# Patient Record
Sex: FEMALE | Race: WHITE | Employment: OTHER | URBAN - METROPOLITAN AREA
[De-identification: names, ages, dates, MRNs, and addresses within clinical notes are randomized per-mention and may not be internally consistent; named-entity substitution may affect disease eponyms.]

---

## 2018-08-07 ENCOUNTER — HOSPITAL ENCOUNTER (OUTPATIENT)
Dept: GENERAL RADIOLOGY | Age: 83
Discharge: HOME | End: 2018-08-07
Attending: FAMILY MEDICINE
Payer: MEDICARE

## 2018-08-07 DIAGNOSIS — R06.09 DYSPNEA ON EXERTION: ICD-10-CM

## 2018-08-07 PROCEDURE — 71046 X-RAY EXAM CHEST 2 VIEWS: CPT

## 2018-08-17 ENCOUNTER — HOSPITAL ENCOUNTER (OUTPATIENT)
Dept: NON INVASIVE DIAGNOSTICS | Age: 83
Discharge: HOME | End: 2018-08-17
Attending: FAMILY MEDICINE
Payer: MEDICARE

## 2018-08-17 DIAGNOSIS — R06.09 DOE (DYSPNEA ON EXERTION): ICD-10-CM

## 2018-08-17 PROCEDURE — 93306 TTE W/DOPPLER COMPLETE: CPT

## 2018-09-27 PROBLEM — L03.019 PARONYCHIA OF FINGER: Status: ACTIVE | Noted: 2018-09-27

## 2018-09-27 PROBLEM — E78.00 HYPERCHOLESTEREMIA: Status: ACTIVE | Noted: 2018-09-27

## 2018-09-27 PROBLEM — K21.9 GERD (GASTROESOPHAGEAL REFLUX DISEASE): Status: ACTIVE | Noted: 2018-09-27

## 2018-09-27 PROBLEM — M17.9 OSTEOARTHRITIS OF KNEE: Status: ACTIVE | Noted: 2018-09-27

## 2018-09-27 PROBLEM — R06.00 DYSPNEA: Status: ACTIVE | Noted: 2018-09-27

## 2018-09-27 RX ORDER — ALBUTEROL SULFATE 90 UG/1
AEROSOL, METERED RESPIRATORY (INHALATION)
COMMUNITY
End: 2019-05-13 | Stop reason: SDUPTHER

## 2018-09-27 RX ORDER — BISMUTH SUBSALICYLATE 262 MG
1 TABLET,CHEWABLE ORAL DAILY
COMMUNITY
End: 2018-10-01

## 2018-09-27 RX ORDER — PROPRANOLOL HYDROCHLORIDE 20 MG/1
TABLET ORAL 2 TIMES DAILY
COMMUNITY
End: 2018-10-09 | Stop reason: SDUPTHER

## 2018-09-27 RX ORDER — B-COMPLEX WITH VITAMIN C
1 TABLET ORAL DAILY
COMMUNITY
End: 2018-10-01

## 2018-10-01 ENCOUNTER — OFFICE VISIT (OUTPATIENT)
Dept: FAMILY MEDICINE CLINIC | Age: 83
End: 2018-10-01

## 2018-10-01 VITALS
SYSTOLIC BLOOD PRESSURE: 126 MMHG | OXYGEN SATURATION: 96 % | TEMPERATURE: 97.8 F | WEIGHT: 172 LBS | DIASTOLIC BLOOD PRESSURE: 81 MMHG | HEIGHT: 65 IN | RESPIRATION RATE: 18 BRPM | HEART RATE: 70 BPM | BODY MASS INDEX: 28.66 KG/M2

## 2018-10-01 DIAGNOSIS — J43.8 OTHER EMPHYSEMA (HCC): Primary | ICD-10-CM

## 2018-10-01 RX ORDER — IPRATROPIUM BROMIDE 21 UG/1
SPRAY, METERED NASAL
Refills: 0 | COMMUNITY
Start: 2018-08-07 | End: 2019-09-06

## 2018-10-01 RX ORDER — NEOMYCIN SULFATE, POLYMYXIN B SULFATE AND HYDROCORTISONE 10; 3.5; 1 MG/ML; MG/ML; [USP'U]/ML
SUSPENSION/ DROPS AURICULAR (OTIC)
Refills: 0 | COMMUNITY
Start: 2018-08-07 | End: 2019-09-06

## 2018-10-01 NOTE — PROGRESS NOTES
Jose Aguiar is a 80 y.o. female presenting for Follow-up Genie [facility] Wheezing/COPD Noise of breathing bothers her. Sometimes dyspnic Long smoking history 30 years No chest pain CXR showed emphysema ECHO was ok Shops, does all ADL's and housework, upstairs bedroom. All no problem. Needs rest for extended walking. Nasal spray helps Takes inderal for tremors Review of Systems Constitutional: Negative for chills, fever and malaise/fatigue. Respiratory: Positive for cough, shortness of breath and wheezing. Negative for hemoptysis and sputum production. Cardiovascular: Negative for chest pain. Past Medical History:  
Diagnosis Date  Benign essential tremor  GERD (gastroesophageal reflux disease)  Hyperlipidemia  Osteoarthritis Past Surgical History:  
Procedure Laterality Date  HX DILATION AND CURETTAGE    
 x2  
 HX TONSILLECTOMY Family History Problem Relation Age of Onset  Cancer Brother 70  
  brain cancer  Cancer Brother 79  
  brain cancer  Cancer Brother 54  
  colon cancer Social History Social History  Marital status:  Spouse name: N/A  
 Number of children: N/A  
 Years of education: N/A Occupational History  Not on file. Social History Main Topics  Smoking status: Former Smoker Packs/day: 2.00 Years: 30.00 Types: Cigarettes Quit date: 4/13/1983  Smokeless tobacco: Never Used  Alcohol use Yes  Drug use: No  
 Sexual activity: Not on file Other Topics Concern  Not on file Social History Narrative Current Outpatient Prescriptions Medication Sig Dispense Refill  ipratropium (ATROVENT) 0.03 % nasal spray INSTILL 2 SPRAYS IN EACH NOSTRIL DAILY  0  
 neomycin-polymyxin-hydrocortisone, buffered, (PEDIOTIC) 3.5-10,000-1 mg/mL-unit/mL-% otic suspension INSTILL 4 DROPS INTO AFFECTED EAR(S) 4 TIMES DAILY  0  
  propranolol (INDERAL) 20 mg tablet Take  by mouth two (2) times a day.  albuterol (PROVENTIL HFA, VENTOLIN HFA, PROAIR HFA) 90 mcg/actuation inhaler Take  by inhalation.  simvastatin (ZOCOR) 40 mg tablet Take  by mouth nightly.  esomeprazole (NEXIUM) 40 mg capsule Take  by mouth daily.  aspirin 81 mg tablet Take 81 mg by mouth. Allergies Allergen Reactions  Sulfa (Sulfonamide Antibiotics) Itching Vitals:  
 10/01/18 1446 BP: 126/81 Pulse: 70 Resp: 18 Temp: 97.8 °F (36.6 °C) TempSrc: Oral  
SpO2: 96% Weight: 172 lb (78 kg) Height: 5' 4.5\" (1.638 m) Body mass index is 29.07 kg/(m^2). Objective General: Patient alert and oriented and in NAD HEENT: PER/EOMI, no conjunctival pallor or scleral icterus. No thyromegaly or cervical lymphadenopathy Heart: Regular rate and rhythm, No murmurs, rubs or gallops. Lungs: Clear to auscultation bilaterally, no wheezing, rales or rhonchi Abd: +BS, non-tender, non-distended Ext: 1+ edema bilateral ankles Skin: No rashes or lesions noted on exposed skin Neuro: AAOx3 Psych: Appropriate mood and affect Assessment and Plan: 
 
1. COPD/Emphysema ([address] Currently mild and with minimal sx Discussed getting flu shot, 
Use albuterol PRN. Discussed:   
Possibly using spiriva, 
Pulmonary rehab and exercise Avoid ill people in winter Could consider PFT's Patient is content to continue what we are currently doing IE albuterol prn and will get flu shot Diagnosis and plan discussed with patient who verbillized understanding. Follow-up Disposition: 
Return if symptoms worsen or fail to improve, for Asthma/COPD follow up.  
 
[facility]

## 2018-10-01 NOTE — PATIENT INSTRUCTIONS
Get flu shot, use albuterol prn. See me if you want additional testing or alternatives to current treatment.

## 2018-10-09 RX ORDER — PROPRANOLOL HYDROCHLORIDE 20 MG/1
TABLET ORAL
Qty: 180 TAB | Refills: 1 | Status: SHIPPED | OUTPATIENT
Start: 2018-10-09 | End: 2019-07-21 | Stop reason: SDUPTHER

## 2019-02-04 RX ORDER — SIMVASTATIN 40 MG/1
TABLET, FILM COATED ORAL
Qty: 90 TAB | Refills: 1 | Status: SHIPPED | OUTPATIENT
Start: 2019-02-04 | End: 2019-07-21 | Stop reason: SDUPTHER

## 2019-02-04 RX ORDER — ESOMEPRAZOLE MAGNESIUM 40 MG/1
CAPSULE, DELAYED RELEASE ORAL
Qty: 90 CAP | Refills: 1 | Status: SHIPPED | OUTPATIENT
Start: 2019-02-04 | End: 2019-07-21 | Stop reason: SDUPTHER

## 2019-07-23 RX ORDER — PROPRANOLOL HYDROCHLORIDE 20 MG/1
TABLET ORAL
Qty: 180 TAB | Refills: 1 | Status: SHIPPED | OUTPATIENT
Start: 2019-07-23 | End: 2019-09-06

## 2019-07-23 RX ORDER — SIMVASTATIN 40 MG/1
TABLET, FILM COATED ORAL
Qty: 90 TAB | Refills: 1 | Status: SHIPPED | OUTPATIENT
Start: 2019-07-23 | End: 2020-01-17

## 2019-07-23 RX ORDER — ESOMEPRAZOLE MAGNESIUM 40 MG/1
CAPSULE, DELAYED RELEASE ORAL
Qty: 90 CAP | Refills: 1 | Status: SHIPPED | OUTPATIENT
Start: 2019-07-23 | End: 2020-01-17

## 2019-08-15 ENCOUNTER — OFFICE VISIT (OUTPATIENT)
Dept: FAMILY MEDICINE CLINIC | Age: 84
End: 2019-08-15

## 2019-08-15 VITALS
WEIGHT: 165 LBS | HEART RATE: 63 BPM | BODY MASS INDEX: 27.49 KG/M2 | DIASTOLIC BLOOD PRESSURE: 76 MMHG | OXYGEN SATURATION: 95 % | SYSTOLIC BLOOD PRESSURE: 132 MMHG | TEMPERATURE: 97.6 F | HEIGHT: 65 IN | RESPIRATION RATE: 16 BRPM

## 2019-08-15 DIAGNOSIS — Z13.39 SCREENING FOR ALCOHOLISM: ICD-10-CM

## 2019-08-15 DIAGNOSIS — Z00.00 MEDICARE ANNUAL WELLNESS VISIT, SUBSEQUENT: ICD-10-CM

## 2019-08-15 DIAGNOSIS — E78.00 HYPERCHOLESTEREMIA: ICD-10-CM

## 2019-08-15 DIAGNOSIS — Z13.31 SCREENING FOR DEPRESSION: ICD-10-CM

## 2019-08-15 DIAGNOSIS — E74.39 GLUCOSE INTOLERANCE: ICD-10-CM

## 2019-08-15 DIAGNOSIS — G20 PARKINSON DISEASE (HCC): Primary | ICD-10-CM

## 2019-08-15 DIAGNOSIS — T88.7XXA MEDICATION SIDE EFFECT: ICD-10-CM

## 2019-08-15 NOTE — PROGRESS NOTES
Omar Ballesteros is a 80 y.o. female      Chief Complaint   Patient presents with   [place] Annual Wellness Visit         1. Have you been to the ER, urgent care clinic since your last visit? Hospitalized since your last visit? no      2. Have you seen or consulted any other health care providers outside of the [address] [place] [address] since your last visit? Include any pap smears or colon screening.   no

## 2019-08-15 NOTE — PATIENT INSTRUCTIONS
Medicare Wellness Visit, Female The best way to live healthy is to have a lifestyle where you eat a well-balanced diet, exercise regularly, limit alcohol use, and quit all forms of tobacco/nicotine, if applicable. Regular preventive services are another way to keep healthy. Preventive services (vaccines, screening tests, monitoring & exams) can help personalize your care plan, which helps you manage your own care. Screening tests can find health problems at the earliest stages, when they are easiest to treat. Marco Villagran follows the current, evidence-based guidelines published by the [facility] ([facility]) when recommending preventive services for our patients. Because we follow these guidelines, sometimes recommendations change over time as research supports it. (For example, mammograms used to be recommended annually. Even though Medicare will still pay for an annual mammogram, the newer guidelines recommend a mammogram every two years for women of average risk.) Of course, you and your doctor may decide to screen more often for some diseases, based on your risk and your health status. Preventive services for you include: - Medicare offers their members a free annual wellness visit, which is time for you and your primary care provider to discuss and plan for your preventive service needs. Take advantage of this benefit every year! 
-All adults over the age of 72 should receive the recommended pneumonia vaccines. Current USPSTF guidelines recommend a series of two vaccines for the best pneumonia protection.  
-All adults should have a flu vaccine yearly and a tetanus vaccine every 10 years. All adults age 61 and older should receive a shingles vaccine once in their lifetime.   
-A bone mass density test is recommended when a woman turns 65 to screen for osteoporosis. This test is only recommended one time, as a screening. Some providers will use this same test as a disease monitoring tool if you already have osteoporosis. -All adults age 38-68 who are overweight should have a diabetes screening test once every three years.  
-Other screening tests and preventive services for persons with diabetes include: an eye exam to screen for diabetic retinopathy, a kidney function test, a foot exam, and stricter control over your cholesterol.  
-Cardiovascular screening for adults with routine risk involves an electrocardiogram (ECG) at intervals determined by your doctor.  
-Colorectal cancer screenings should be done for adults age 54-65 with no increased risk factors for colorectal cancer. There are a number of acceptable methods of screening for this type of cancer. Each test has its own benefits and drawbacks. Discuss with your doctor what is most appropriate for you during your annual wellness visit. The different tests include: colonoscopy (considered the best screening method), a fecal occult blood test, a fecal DNA test, and sigmoidoscopy. -Breast cancer screenings are recommended every other year for women of normal risk, age 54-69. 
-Cervical cancer screenings for women over age 72 are only recommended with certain risk factors.  
-All adults born between [facility] should be screened once for Hepatitis C. Here is a list of your current Health Maintenance items (your personalized list of preventive services) with a due date: 
Health Maintenance Due Topic Date Due  Shingles Vaccine (1 of 2) 08/22/1981  Glaucoma Screening   08/22/1996  Bone Mineral Density   08/22/1996  
 DTaP/Tdap/Td  (1 - Tdap) 05/11/2006 [place] Annual Well Visit  03/14/2018  Flu Vaccine  08/01/2019 Parkinson's Disease: Care Instructions Your Care Instructions Parkinson's disease can cause tremors, stiffness, and problems with movement. Severe or advanced cases can also cause problems with thinking. In Parkinson's disease, part of the brain cannot make enough dopamine, a chemical that helps control movement. Taking your medicines correctly and getting regular exercise may help you maintain your quality of life. There are many things that can cause Parkinson's disease symptoms, including some medicine, some toxins, and trauma to the head. The cause in most cases is not known. Follow-up care is a key part of your treatment and safety. Be sure to make and go to all appointments, and call your doctor if you are having problems. It's also a good idea to know your test results and keep a list of the medicines you take. How can you care for yourself at home? General care · Take your medicines exactly as prescribed. Call your doctor if you think you are having a problem with your medicine. · Make sure your home is safe: 
? Place furniture so that you have something to hold on to as you walk around the house. ? Use chairs that make it easier to sit down and stand up. ? Group the things you use most, such as reading glasses, keys, and the telephone, in one easy-to-reach place. ? Tack down rugs so that you do not trip. ? Put no-slip tape and handrails in the tub to prevent falls. · Use a cane, walker, or scooter if your doctor suggests it. · Keep up your normal activities as much as you can. · Find ways to manage stress, which can make symptoms worse. · Spend time with family and friends. Join a support group for people with Parkinson's disease if you want extra help. · Depression is common with this condition. Tell your doctor if you have trouble sleeping, are eating too much or are not hungry, or feel sad or tearful all the time. Depression can be treated with medicine and counseling. Diet and exercise · Eat a balanced diet. · If you are taking levodopa, do not eat protein at the same time you take your medicine.  Levodopa may not work as well if you take it at the same time you eat protein. You can eat normal amounts of protein. Talk to your doctor if you have questions. · If you have problems swallowing, change how and what you eat: ? Try thick drinks, such as milk shakes. They are easier to swallow than other fluids. ? Do not eat foods that crumble easily. These can cause choking. ? Use a  to prepare food. Soft foods need less chewing. ? Eat small meals often so that you do not get tired from eating heavy meals. · Drink plenty of water and eat a high-fiber diet to prevent constipation. Parkinson'sand the medicines that treat itmay slow your intestines. · Get exercise on most days. Work with your doctor to set up a program of walking, swimming, or other exercise you are able to do. When should you call for help? Call your doctor now or seek immediate medical care if: 
  · You have a change in your symptoms.  
  · You develop other problems from your condition, such as: 
? Injury from a fall. ? Thinking or memory problems. ? A urinary tract infection (burning pain when urinating).  
 Watch closely for changes in your health, and be sure to contact your doctor if: 
  · You lose weight because of problems with eating.  
  · You want more information about your condition or your medicines. Where can you learn more? Go to http://erick-cas.info/. Enter J512 in the search box to learn more about \"Parkinson's Disease: Care Instructions. \" Current as of: March 28, 2019 Content Version: 12.1 © 6089-0548 [employer]. Care instructions adapted under license by [employer] (which disclaims liability or warranty for this information). If you have questions about a medical condition or this instruction, always ask your healthcare professional. [place] [address] any warranty or liability for your use of this information.

## 2019-08-15 NOTE — PROGRESS NOTES
This is the Subsequent Medicare Annual Wellness Exam, performed 12 months or more after the Initial AWV or the last Subsequent AWV    I have reviewed the patient's medical history in detail and updated the computerized patient record. History     Past Medical History:   Diagnosis Date    Benign essential tremor     GERD (gastroesophageal reflux disease)     Hyperlipidemia     Osteoarthritis       Past Surgical History:   Procedure Laterality Date    HX DILATION AND CURETTAGE      x2    HX TONSILLECTOMY       Current Outpatient Medications   Medication Sig Dispense Refill    diph,pertuss,acel,,tetanus vac,PF, (ADACEL) 2 Lf-(2.5-5-3-5 mcg)-5Lf/0.5 mL syrg vaccine 0.5 mL by IntraMUSCular route once for 1 dose. 0.5 mL 0    varicella-zoster recombinant, PF, (SHINGRIX, PF,) 50 mcg/0.5 mL susr injection 0.5mL by IntraMUSCular route once now and then repeat in 2-6 months 0.5 mL 1    simvastatin (ZOCOR) 40 mg tablet TAKE 1 TABLET AT BEDTIME 90 Tab 1    esomeprazole (NEXIUM) 40 mg capsule TAKE 1 CAPSULE DAILY 90 Cap 1    propranolol (INDERAL) 20 mg tablet TAKE 1 TABLET TWICE A  Tab 1    VENTOLIN HFA 90 mcg/actuation inhaler INHALE 1-2 PUFFS BY MOUTH EVERY 4-6 HOURS AS NEEDED FOR SHORTNESS OF BREATH,COUGH,WHEEZING 18 Inhaler 2    ipratropium (ATROVENT) 0.03 % nasal spray INSTILL 2 SPRAYS IN EACH NOSTRIL DAILY  0    neomycin-polymyxin-hydrocortisone, buffered, (PEDIOTIC) 3.5-10,000-1 mg/mL-unit/mL-% otic suspension INSTILL 4 DROPS INTO AFFECTED EAR(S) 4 TIMES DAILY  0    aspirin 81 mg tablet Take 81 mg by mouth.          Allergies   Allergen Reactions    Sulfa (Sulfonamide Antibiotics) Itching     Family History   Problem Relation Age of Onset    Cancer Brother 70        brain cancer    Cancer Brother 79        brain cancer    Cancer Brother 54        colon cancer     Social History     Tobacco Use    Smoking status: Former Smoker     Packs/day: 2.00     Years: 30.00     Pack years: 60.00 Types: Cigarettes     Last attempt to quit: 1983     Years since quittin.3    Smokeless tobacco: Never Used   Substance Use Topics    Alcohol use: Yes     Patient Active Problem List   Diagnosis Code    GERD (gastroesophageal reflux disease) K21.9    Hypercholesteremia E78.00    Osteoarthritis of knee M17.10    Paronychia of finger L03.019    Dyspnea R06.00    Other emphysema ([address] J43.8       Depression Risk Factor Screening:     3 most recent PHQ Screens 8/15/2019   Little interest or pleasure in doing things Not at all   Feeling down, depressed, irritable, or hopeless Not at all   Total Score PHQ 2 0     Alcohol Risk Factor Screening: You do not drink alcohol or very rarely. Functional Ability and Level of Safety:   Hearing Loss  Hearing is good. The patient needs further evaluation. Activities of Daily Living  The home contains: handrails  Patient does total self care    Fall Risk  Fall Risk Assessment, last 12 mths 8/15/2019   Able to walk? Yes   Fall in past 12 months? No       Abuse Screen  Patient is not abused    Cognitive Screening   Evaluation of Cognitive Function:  Has your family/caregiver stated any concerns about your memory: no  Normal    Patient Care Team   Patient Care Team:  Radha Alberts MD as PCP - General (Family Practice)    Assessment/Plan   Education and counseling provided:  Are appropriate based on today's review and evaluation  End-of-Life planning (with patient's consent)  Pneumococcal Vaccine  Influenza Vaccine  Screening Mammography  Screening Pap and pelvic (covered once every 2 years)  Colorectal cancer screening tests  Screening for glaucoma    Tremor a lot worse particularly left arm but occasionally affected all extremitiies  No falls or balance issues  Beta blocker has not helped.     Hyperchol on statin doing well    Blood sugar up in past, has lost weight    ROS:  General/Constitutional:  No headache, fever, fatigue, weight loss or weight gain Eyes:  No redness, pruritis, pain, visual changes, swelling, or discharge    Ears:  No pain, loss or changes in hearin    Neck:  No swelling, masses, stiffness, pain, or limited movemen    Cardiac:   No chest pain, racing heartbeat or palpitations  Respiratory:  No cough or shortness of breath    GI:  No nausea/vomiting, diarrhea, abdominal pain, bloody or dark stools     :  No dysuria or  hematuria   Musculoskeletal:  No joint pain, muscle pain, back pain, neck pain. No joint swelling or redness. Neurological:  No loss of consciousness, dizziness, seizures, dysarthria, cognitive changes, memory changes,  problems with balance, or unilateral weakness    Skin: No rash     Physical Examination: General appearance - alert, well appearing, and in no distress, oriented to person, place, and time and normal appearing weight  Mental status -  normal mood, behavior, speech, dress, motor activity, and thought processes, no expressed suicidal or homicidal ideation, no hallucinations  Ears - bilateral TM's and external ear canals normal  Nose - normal and patent, no erythema, discharge or polyps  Mouth - mucous membranes moist, pharynx normal without lesions  Neck - supple, no significant adenopathy  Breast-sees GYN  Chest - normal chest excursion, normal inspiratory and expiratory function. Clear to ausculation bilaterally. Heart - normal rate, regular rhythm, normal S1, S2, no murmurs, rubs, clicks or gallops, no extremity edema  Abdomen- benign, no organomegaly or masses  GYN-sees GYN  Skin-no rashes or suspicious moles  Neuro normal speech, moves all extremities, normal gait, Marked TREMOR at rest, most prominent left hand, pill rolling  Musculoskeletal- grossly normal joint and motor function. Diagnoses and all orders for this visit:    1. Parkinson disease ([facility] [address] new onset  Tremor much worse that previous and now at rest with pill rolling  Referral Dr. Sebastien Lennon    2. Hypercholesteremia  Continue statin  -     LIPID PANEL  -     HEPATIC FUNCTION PANEL    3. Glucose intolerance  -     METABOLIC PANEL, BASIC  -     HEMOGLOBIN A1C WITH EAG  -     CBC WITH AUTOMATED DIFF    4. Medication side effect  -     METABOLIC PANEL, BASIC  -     HEMOGLOBIN A1C WITH EAG  -     CBC WITH AUTOMATED DIFF    5. Medicare annual wellness visit, subsequent updated  -     diph,pertuss,acel,,tetanus vac,PF, (ADACEL) 2 Lf-(2.5-5-3-5 mcg)-5Lf/0.5 mL syrg vaccine; 0.5 mL by IntraMUSCular route once for 1 dose.  -     varicella-zoster recombinant, PF, (SHINGRIX, PF,) 50 mcg/0.5 mL susr injection; 0.5mL by IntraMUSCular route once now and then repeat in 2-6 months    6.  Screening for alcoholism no issues  -     NE ANNUAL ALCOHOL SCREEN 15 MIN    7. Screening for depression no issues  -     [place] Maintenance Due   Topic Date Due    Shingrix Vaccine Age 50> (1 of 2) 08/22/1981    GLAUCOMA SCREENING Q2Y  08/22/1996    Bone Densitometry (Dexa) Screening  08/22/1996    DTaP/Tdap/Td series (1 - Tdap) 05/11/2006    MEDICARE YEARLY EXAM  03/14/2018    Influenza Age 9 to Adult  08/01/2019

## 2019-08-16 LAB
ALBUMIN SERPL-MCNC: 4.2 G/DL (ref 3.5–4.7)
ALP SERPL-CCNC: 55 IU/L (ref 39–117)
ALT SERPL-CCNC: 12 IU/L (ref 0–32)
AST SERPL-CCNC: 18 IU/L (ref 0–40)
BASOPHILS # BLD AUTO: 0 X10E3/UL (ref 0–0.2)
BASOPHILS NFR BLD AUTO: 1 %
BILIRUB DIRECT SERPL-MCNC: 0.2 MG/DL (ref 0–0.4)
BILIRUB SERPL-MCNC: 0.6 MG/DL (ref 0–1.2)
BUN SERPL-MCNC: 13 MG/DL (ref 8–27)
BUN/CREAT SERPL: 14 (ref 12–28)
CALCIUM SERPL-MCNC: 9.3 MG/DL (ref 8.7–10.3)
CHLORIDE SERPL-SCNC: 104 MMOL/L (ref 96–106)
CHOLEST SERPL-MCNC: 135 MG/DL (ref 100–199)
CO2 SERPL-SCNC: 26 MMOL/L (ref 20–29)
CREAT SERPL-MCNC: 0.92 MG/DL (ref 0.57–1)
EOSINOPHIL # BLD AUTO: 0.1 X10E3/UL (ref 0–0.4)
EOSINOPHIL NFR BLD AUTO: 2 %
ERYTHROCYTE [DISTWIDTH] IN BLOOD BY AUTOMATED COUNT: 14 % (ref 12.3–15.4)
EST. AVERAGE GLUCOSE BLD GHB EST-MCNC: 120 MG/DL
GLUCOSE SERPL-MCNC: 88 MG/DL (ref 65–99)
HBA1C MFR BLD: 5.8 % (ref 4.8–5.6)
HCT VFR BLD AUTO: 42.6 % (ref 34–46.6)
HDLC SERPL-MCNC: 68 MG/DL
HGB BLD-MCNC: 13.6 G/DL (ref 11.1–15.9)
IMM GRANULOCYTES # BLD AUTO: 0 X10E3/UL (ref 0–0.1)
IMM GRANULOCYTES NFR BLD AUTO: 0 %
LDLC SERPL CALC-MCNC: 48 MG/DL (ref 0–99)
LYMPHOCYTES # BLD AUTO: 1.3 X10E3/UL (ref 0.7–3.1)
LYMPHOCYTES NFR BLD AUTO: 32 %
MCH RBC QN AUTO: 29.4 PG (ref 26.6–33)
MCHC RBC AUTO-ENTMCNC: 31.9 G/DL (ref 31.5–35.7)
MCV RBC AUTO: 92 FL (ref 79–97)
MONOCYTES # BLD AUTO: 0.3 X10E3/UL (ref 0.1–0.9)
MONOCYTES NFR BLD AUTO: 8 %
NEUTROPHILS # BLD AUTO: 2.3 X10E3/UL (ref 1.4–7)
NEUTROPHILS NFR BLD AUTO: 57 %
PLATELET # BLD AUTO: 182 X10E3/UL (ref 150–450)
POTASSIUM SERPL-SCNC: 5 MMOL/L (ref 3.5–5.2)
PROT SERPL-MCNC: 6.7 G/DL (ref 6–8.5)
RBC # BLD AUTO: 4.63 X10E6/UL (ref 3.77–5.28)
SODIUM SERPL-SCNC: 143 MMOL/L (ref 134–144)
TRIGL SERPL-MCNC: 94 MG/DL (ref 0–149)
VLDLC SERPL CALC-MCNC: 19 MG/DL (ref 5–40)
WBC # BLD AUTO: 4 X10E3/UL (ref 3.4–10.8)

## 2019-09-06 ENCOUNTER — OFFICE VISIT (OUTPATIENT)
Dept: NEUROLOGY | Age: 84
End: 2019-09-06

## 2019-09-06 VITALS
HEART RATE: 84 BPM | OXYGEN SATURATION: 96 % | RESPIRATION RATE: 18 BRPM | WEIGHT: 165 LBS | DIASTOLIC BLOOD PRESSURE: 66 MMHG | HEIGHT: 65 IN | SYSTOLIC BLOOD PRESSURE: 156 MMHG | BODY MASS INDEX: 27.49 KG/M2

## 2019-09-06 DIAGNOSIS — G20 PARKINSONISM, UNSPECIFIED PARKINSONISM TYPE (HCC): Primary | ICD-10-CM

## 2019-09-06 RX ORDER — CARBIDOPA AND LEVODOPA 25; 100 MG/1; MG/1
TABLET ORAL
Qty: 90 TAB | Refills: 2 | Status: SHIPPED | OUTPATIENT
Start: 2019-09-06 | End: 2019-10-07 | Stop reason: SDUPTHER

## 2019-09-06 NOTE — PROGRESS NOTES
[facility] and [address] at [facility] at [facility] [address], [address] [facility] [address] [facility], [address] [place] [address]  [phone] Office  [phone] Facsimile           Referring: Charity Lanes, MD      Chief Complaint   Patient presents with    New Patient    Tremors     worse in left hand, sometimes will have in right and in legs. ongoing for yrs but has increased. 59-year-old woman who presents today for evaluate tremor. She notes that it is in the left hand primarily. Sometimes in her right hand. She is previously been on Inderal 20 mg twice daily but discontinued it because it did not help. Review of Dr. Horacio Meehan note finds that his serial examinations have demonstrated increase in tremor and his concern was related to a pill-rolling type quality that he sees at present. Patient tells me she noticed a tremor about 20 years ago and is just gotten progressively worse. Left hand is worse than the right. Its rest tremor. She is also noted that her right lower extremity has started to shake at rest.  She has not had any change in her walking. No falls. He does say her writing is gotten smaller. No change in her voice. She was tried on Inderal 20 mg twice daily and notes that it really did not help but then she thinks it may be and she says it might be in her head that is a little worse since then. No other medication trials. No chest pain palpitations shortness of breath fever chills or injury.     Past Medical History:   Diagnosis Date    Benign essential tremor     GERD (gastroesophageal reflux disease)     Hyperlipidemia     Osteoarthritis        Past Surgical History:   Procedure Laterality Date    HX DILATION AND CURETTAGE  1984    x2    HX TONSILLECTOMY  1957       Current Outpatient Medications   Medication Sig Dispense Refill    simvastatin (ZOCOR) 40 mg tablet TAKE 1 TABLET AT BEDTIME 90 Tab 1    esomeprazole (NEXIUM) 40 mg capsule TAKE 1 CAPSULE DAILY 90 Cap 1    VENTOLIN HFA 90 mcg/actuation inhaler INHALE 1-2 PUFFS BY MOUTH EVERY 4-6 HOURS AS NEEDED FOR SHORTNESS OF BREATH,COUGH,WHEEZING 18 Inhaler 2    aspirin 81 mg tablet Take 81 mg by mouth.  varicella-zoster recombinant, PF, (SHINGRIX, PF,) 50 mcg/0.5 mL susr injection 0.5mL by IntraMUSCular route once now and then repeat in 2-6 months 0.5 mL 1    propranolol (INDERAL) 20 mg tablet TAKE 1 TABLET TWICE A DAY (Patient taking differently: Take 20 mg by mouth two (2) times a day. Indications: tremor) 180 Tab 1    ipratropium (ATROVENT) 0.03 % nasal spray INSTILL 2 SPRAYS IN EACH NOSTRIL DAILY  0    neomycin-polymyxin-hydrocortisone, buffered, (PEDIOTIC) 3.5-10,000-1 mg/mL-unit/mL-% otic suspension INSTILL 4 DROPS INTO AFFECTED EAR(S) 4 TIMES DAILY  0        Allergies   Allergen Reactions    Sulfa (Sulfonamide Antibiotics) Itching       Social History     Tobacco Use    Smoking status: Former Smoker     Packs/day: 2.00     Years: 30.00     Pack years: 60.00     Types: Cigarettes     Last attempt to quit: 1983     Years since quittin.4    Smokeless tobacco: Never Used   Substance Use Topics    Alcohol use: Yes    Drug use: Never       Family History   Problem Relation Age of Onset    Cancer Brother 70        brain cancer    Cancer Brother 79        brain cancer    Cancer Brother 54        colon cancer       Review of Systems  Pertinent positives and negatives as noted with remainder of comprehensive review negative    Examination  Visit Vitals  /66 (BP 1 Location: Left arm, BP Patient Position: Sitting)   Pulse 84   Resp 18   Ht 5' 4.5\" (1.638 m)   Wt 74.8 kg (165 lb)   SpO2 96%   BMI 27.88 kg/m²     She is a pleasant elderly lady with appropriate dress and grooming. Her affect is normal.  She has no icterus. No edema. Her heart is regular.   I do not hear a bruit. Neurologically she is awake alert oriented and conversant. Speech and language normal.  Cognition normal.  Cranial nerves intact 2-12 and there is no limitation of gaze. She has no pronation or drift. She has a rest tremor of the left upper extremity with some pill-rolling characteristics. Rest tremor of the right lower extremity. No tremor noted in the right upper extremity or elsewhere. She has no pronation or drift. She has postural reemergence tremor of the left hand. She has no cogwheel rigidity. Some age-related paratonia throughout. She resists fully in all muscle groups to direct testing in the upper and lower shoulders bilaterally. Reflexes are symmetrical upper and lower extremities bilaterally. No pathologic reflexes are elicited. No ataxia. She ambulates steadily with a good stride and she has accentuation of the left hand tremor with ambulation. Sensory is intact to primary modalities. Impression/Plan  27-year-old lady with parkinsonism. We discussed this. 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 The fact that this is been progressive over the last 20 years really makes me think this is Parkinson's disease proper and will see how she response to Sinemet. We discussed pathophysiology, dopamine deficiency, medications, potential side effects potential benefits and expectations. Start Sinemet 25/100 1/2 tablet at 8 AM, 12 noon 4 PM x2 weeks then 1 full tablet at the same intervals. Follow-up in 4 weeks    Erendira Hill MD    This note was created using voice recognition software. Despite editing, there may be syntax errors. This note will not be viewable in [address]

## 2019-10-07 ENCOUNTER — OFFICE VISIT (OUTPATIENT)
Dept: NEUROLOGY | Age: 84
End: 2019-10-07

## 2019-10-07 VITALS
HEIGHT: 65 IN | BODY MASS INDEX: 27.82 KG/M2 | RESPIRATION RATE: 16 BRPM | OXYGEN SATURATION: 97 % | HEART RATE: 76 BPM | WEIGHT: 167 LBS | DIASTOLIC BLOOD PRESSURE: 74 MMHG | SYSTOLIC BLOOD PRESSURE: 132 MMHG

## 2019-10-07 DIAGNOSIS — G20 PARKINSONISM, UNSPECIFIED PARKINSONISM TYPE (HCC): Primary | ICD-10-CM

## 2019-10-07 RX ORDER — CARBIDOPA AND LEVODOPA 25; 100 MG/1; MG/1
TABLET ORAL
Qty: 270 TAB | Refills: 3 | Status: SHIPPED | OUTPATIENT
Start: 2019-10-07 | End: 2020-01-06

## 2019-10-07 NOTE — PROGRESS NOTES
[facility] and  [address] at [facility] Neurology [facility]ics at [address] [facility], [address] [facility] [address] [facility], [address] [place] [address]   [phone]              Chief Complaint   Patient presents with   [address] [facility] [address] Tremors     sees improvement with medications florian at night, however, if stressed, will notice increase     Current Outpatient Medications   Medication Sig Dispense Refill    carbidopa-levodopa (SINEMET)  mg per tablet 1 po at 8am, 12noon and 4pm 90 Tab 2    simvastatin (ZOCOR) 40 mg tablet TAKE 1 TABLET AT BEDTIME 90 Tab 1    esomeprazole (NEXIUM) 40 mg capsule TAKE 1 CAPSULE DAILY 90 Cap 1    VENTOLIN HFA 90 mcg/actuation inhaler INHALE 1-2 PUFFS BY MOUTH EVERY 4-6 HOURS AS NEEDED FOR SHORTNESS OF BREATH,COUGH,WHEEZING 18 Inhaler 2    aspirin 81 mg tablet Take 81 mg by mouth. Allergies   Allergen Reactions    Sulfa (Sulfonamide Antibiotics) Itching     Social History     Tobacco Use    Smoking status: Former Smoker     Packs/day: 2.00     Years: 30.00     Pack years: 60.00     Types: Cigarettes     Last attempt to quit: 1983     Years since quittin.5    Smokeless tobacco: Never Used   Substance Use Topics    Alcohol use: Yes    Drug use: Never   80-year-old lady returns today in follow-up. I saw her recently for an initial consultation where she appeared parkinsonian. We discussed options. We started her on the Sinemet titrating up to a full tablet of the 25/100 preparation at 8 AM, 12 noon 4 PM.  Since then the she relates definite improvement and does not get the tremor at night. When she gets stressed her tremor gets a little worse. She has not had any orthostasis vivid dreams or hallucination. She does note that she wonders sometimes if she is just making accommodation or if the tremor is really better but overall it is better she believes.   She has not noticed any changes in her ambulation. Overall she is happy. Examination  Visit Vitals  /74 (BP 1 Location: Left arm, BP Patient Position: Sitting)   Pulse 76   Resp 16   Ht 5' 4.5\" (1.638 m)   Wt 75.8 kg (167 lb)   SpO2 97%   BMI 28.22 kg/m²   Pleasant elderly lady. Her dress and grooming are appropriate. No icterus. She is awake alert oriented and conversant. Speech and language normal.  Cognition normal.  She has minimal rest tremor of the left hand and no cogwheeling at the wrist bilaterally. The tremor accentuates just to touch with ambulation. She has a good stride. Takes a pivot turn and is steady    Impression/Plan  Parkinsonism with positive response to Sinemet. Continue this. We will see her back in 3 months and if she continues to do well we will stretch her out to 6-month follow-up. Zaria Romero MD      This note was created using voice recognition software. Despite editing, there may be syntax errors. This note will not be viewable in [address]

## 2019-10-15 ENCOUNTER — OFFICE VISIT (OUTPATIENT)
Dept: FAMILY MEDICINE CLINIC | Age: 84
End: 2019-10-15

## 2019-10-15 VITALS
OXYGEN SATURATION: 97 % | HEIGHT: 65 IN | WEIGHT: 165 LBS | BODY MASS INDEX: 27.49 KG/M2 | RESPIRATION RATE: 16 BRPM | TEMPERATURE: 97.7 F | SYSTOLIC BLOOD PRESSURE: 123 MMHG | HEART RATE: 67 BPM | DIASTOLIC BLOOD PRESSURE: 72 MMHG

## 2019-10-15 DIAGNOSIS — G20 PARKINSON'S DISEASE (HCC): Primary | ICD-10-CM

## 2019-10-15 NOTE — PATIENT INSTRUCTIONS
Parkinson's Disease: Care Instructions  Your Care Instructions  Parkinson's disease can cause tremors, stiffness, and problems with movement. Severe or advanced cases can also cause problems with thinking. In Parkinson's disease, part of the brain cannot make enough dopamine, a chemical that helps control movement. Taking your medicines correctly and getting regular exercise may help you maintain your quality of life. There are many things that can cause Parkinson's disease symptoms, including some medicine, some toxins, and trauma to the head. The cause in most cases is not known. Follow-up care is a key part of your treatment and safety. Be sure to make and go to all appointments, and call your doctor if you are having problems. It's also a good idea to know your test results and keep a list of the medicines you take. How can you care for yourself at home? General care  · Take your medicines exactly as prescribed. Call your doctor if you think you are having a problem with your medicine. · Make sure your home is safe:  ? Place furniture so that you have something to hold on to as you walk around the house. ? Use chairs that make it easier to sit down and stand up. ? Group the things you use most, such as reading glasses, keys, and the telephone, in one easy-to-reach place. ? Tack down rugs so that you do not trip. ? Put no-slip tape and handrails in the tub to prevent falls. · Use a cane, walker, or scooter if your doctor suggests it. · Keep up your normal activities as much as you can. · Find ways to manage stress, which can make symptoms worse. · Spend time with family and friends. Join a support group for people with Parkinson's disease if you want extra help. · Depression is common with this condition. Tell your doctor if you have trouble sleeping, are eating too much or are not hungry, or feel sad or tearful all the time. Depression can be treated with medicine and counseling.   Diet and exercise  · Eat a balanced diet. · If you are taking levodopa, do not eat protein at the same time you take your medicine. Levodopa may not work as well if you take it at the same time you eat protein. You can eat normal amounts of protein. Talk to your doctor if you have questions. · If you have problems swallowing, change how and what you eat:  ? Try thick drinks, such as milk shakes. They are easier to swallow than other fluids. ? Do not eat foods that crumble easily. These can cause choking. ? Use a  to prepare food. Soft foods need less chewing. ? Eat small meals often so that you do not get tired from eating heavy meals. · Drink plenty of water and eat a high-fiber diet to prevent constipation. Parkinson's--and the medicines that treat it--may slow your intestines. · Get exercise on most days. Work with your doctor to set up a program of walking, swimming, or other exercise you are able to do. When should you call for help? Call your doctor now or seek immediate medical care if:    · You have a change in your symptoms.     · You develop other problems from your condition, such as:  ? Injury from a fall. ? Thinking or memory problems. ? A urinary tract infection (burning pain when urinating).    Watch closely for changes in your health, and be sure to contact your doctor if:    · You lose weight because of problems with eating.     · You want more information about your condition or your medicines. Where can you learn more? Go to http://erick-cas.info/. Enter Y333 in the search box to learn more about \"Parkinson's Disease: Care Instructions. \"  Current as of: March 28, 2019  Content Version: 12.2  © 6822-5890 [employer]. Care instructions adapted under license by [employer] (which disclaims liability or warranty for this information).  If you have questions about a medical condition or this instruction, always ask your healthcare professional. [employer] disclaims any warranty or liability for your use of this information. Movement Disorders: Exercises  Introduction  Here are some examples of exercises for problems with movement. Your doctor or physical therapist will tell you when you can start these exercises and which ones will work best for you. Problems with movement can happen along with many conditions, such as multiple sclerosis, Parkinson's disease, or damage from a stroke. No matter what is making movement hard for you, these exercises can help you be more flexible, strong, and steady on your feet. Start each exercise slowly. Ease off the exercise if you start to have pain. How to do the exercises  Prayer stretch    1. Start with your palms together in front of your chest, just below your chin. 2. Slowly lower your hands toward your waistline, keeping your hands close to your stomach and your palms together until you feel a mild to moderate stretch under your forearms. 3. Hold for at least 15 to 30 seconds. Repeat 2 to 4 times. Shoulder stretch    1.  a doorway, and place one arm against the door frame. Your elbow should be a little higher than your shoulder. 2. Relax your shoulders as you lean forward, allowing your chest and shoulder muscles to stretch. You can also turn your body slightly away from your arm to stretch the muscles even more. 3. Hold for 15 to 30 seconds. 4. Repeat 2 to 4 times with each arm. Calf stretch    1. Place your hands on a wall for balance. You can also do this with your hands on the back of a chair or countertop. 2. Step back with your right leg. Keep the leg straight, and press your right heel into the floor. 3. Press your hips forward, bending your left leg slightly. You will feel the stretch in your right calf. 4. Hold the stretch 15 to 30 seconds. 5. Repeat 2 to 4 times with each leg. Hip flexor stretch (edge of table)    1.  Lie flat on your back on a table or flat bench, with your knees and lower legs hanging off the edge of the table. 2. Grab one leg at the knee, and pull that knee back toward your chest. Relax the other leg. Let it hang down toward the floor until you feel a stretch in the upper thigh of that leg and hip. 3. Hold the stretch for at least 15 to 30 seconds. 4. Repeat 2 to 4 times for each leg. Back stretches    1. Get down on your hands and knees on the floor. 2. Relax your head, and allow it to droop. Round your back up toward the ceiling until you feel a nice stretch in your upper, middle, and lower back. Hold this stretch for as long as it feels comfortable, or about 15 to 30 seconds. 3. Return to the starting position with a flat back while you are on your hands and knees. 4. Let your back sway by pressing your stomach toward the floor. Lift your buttocks toward the ceiling. 5. Hold this position for 15 to 30 seconds. 6. Repeat 2 to 4 times. Midback stretch    1. Kneel on the floor, and sit back on your ankles. 2. Lean forward, place your hands on the floor, and stretch your arms out in front of you. Rest your head between your arms. 3. Gently push your chest toward the floor, reaching as far in front of you as you can. 4. Hold for 15 to 30 seconds. 5. Repeat 2 to 4 times. Press-up stretch    1. Lie on your stomach, supporting your body with your forearms. 2. Press your elbows down into the floor to raise your upper back. As you do this, relax your stomach muscles and allow your back to arch without using your back muscles. As you press up, do not let your hips or pelvis come off the floor. 3. Hold for 15 to 30 seconds, then relax. 4. Repeat 2 to 4 times. Chest and shoulder stretches    1. Put a few towels on top of one another and roll them together lengthwise. 2. Lie down, and place the roll of towels along the bones of your spine from your neck to your tailbone. Or lie on a foam roller if you have one.   3. Make sure that your head and tailbone area are supported with the roll of towels or on the foam roller. Be sure the towel roll or foam roller is in line with your spine. 4. Bend your knees to support your lower back. 5. Hold this position while you move your arms into the following positions:  1. Arms down at your sides, with the palms facing up. 2. Arms out to your sides in a \"T\" shape. 3. Arms out to your sides with your elbows bent to 90 degrees, as in a \"goalpost\" shape. 4. Arms stretched over your head. 6. Hold each arm position for 15 to 30 seconds. 7. Repeat the entire cycle of arm movements 2 to 4 times. Single knee-to-chest stretch    1. Lie on your back with your knees bent and your feet flat on the floor. You can put a small pillow under your head and neck if it is more comfortable. 2. Bring one knee to your chest, keeping the other foot flat on the floor. 3. Keep your lower back pressed to the floor. Hold for 15 to 30 seconds. 4. Relax, and lower the knee to the starting position. 5. Repeat with the other leg. Repeat 2 to 4 times with each leg. 6. To get more stretch, keep your other leg flat on the floor while pulling your knee to your chest.    Hip rotator stretch    1. Lie on your back with both knees bent and your feet flat on the floor. 2. Put the ankle of one leg on your opposite thigh near your knee. 3. Use your hand to gently push the raised knee away from your body until you feel a gentle stretch around your hip. 4. Hold the stretch for 15 to 30 seconds. 5. Repeat 2 to 4 times with each leg. Hamstring wall stretch    1. Lie on your back in a doorway, with your lower legs through the open door. 2. Slide the leg next to the doorway up the wall to straighten your knee. You should feel a gentle stretch down the back of your leg. 1. Do not arch your back. 2. Do not bend either knee. 3. Keep one heel touching the floor and the other heel touching the wall.  Do not point your toes.  3. Hold the stretch for at least 1 minute to start. As you get used to it, work toward holding the stretch for as long as 6 minutes. 4. Do this exercise 2 to 4 times with one leg. Then move to the other side of the doorway to stretch the other leg. Hand flips for coordination    1. While seated, place your forearm and wrist on your thigh, palm down. 2. Flip your hand over so the back of your hand rests on your thigh and your palm is up. Alternate between palm up and palm down while keeping your forearm on your thigh. 3. Repeat 8 to 12 times with each hand. Finger opposition for coordination    1. With one hand, point your fingers and thumb straight up. Your wrist should be relaxed, following the line of your fingers and thumb. 2. Touch your thumb to each finger, one finger at a time. This will look like an \"okay\" sign, but try to keep your other fingers straight and pointing upward as much as you can. 3. Repeat 8 to 12 times with each hand. Finger extension for coordination    1. Place your hand flat on a table. 2. Lift and then lower one finger at a time off the table. 3. Repeat 8 to 12 times with each hand. Shoulder blade squeeze for strength    1. Stand with your arms at your sides, and squeeze your shoulder blades together. Do not raise your shoulders up as you squeeze. 2. Hold 6 seconds. 3. Repeat 8 to 12 times. Bridging for strength    1. Lie on your back with both knees bent. Your knees should be bent about 90 degrees. 2. Then push your feet into the floor, squeeze your buttocks, and lift your hips off the floor until your shoulders, hips, and knees are all in a straight line. 3. Hold for about 6 seconds as you continue to breathe normally. 4. Slowly lower your hips back down to the floor. Rest for up to 10 seconds. 5. Repeat 8 to 12 times. Single-leg balance    1. Stand on a flat surface with your arms stretched out to your sides like you are making the letter \"T. \" Then lift one leg off the floor, bending it at the knee. If you are not steady on your feet, use one hand to hold on to a chair, counter, or wall. 2. Keep your standing knee straight. Try to balance on that leg for up to 30 seconds. Then rest for up to 10 seconds. 3. Repeat 6 to 8 times with each leg. 4. When you can balance on one leg for 30 seconds with your eyes open, try to balance on it with your eyes closed. 5. When you can do this exercise with your eyes closed for 30 seconds and with ease and no pain, try it while standing on a pillow or piece of foam.    Alternate arm and leg (bird dog) balance    1. Start on the floor, on your hands and knees. 2. Tighten your belly muscles by pulling your belly button in toward your spine. Be sure you continue to breathe normally and do not hold your breath. 3. Raise one arm off the floor, and hold it straight out in front of you. Be careful not to let your shoulder drop down, because that will twist your trunk. 4. Hold for about 6 seconds, then lower your arm and switch to your other arm. 5. Repeat 8 to 12 times with each arm. 6. When you can do this exercise with ease and no pain, try it with one leg raised off the floor. Hold your leg straight out behind you. Be careful not to let your hip drop down, because that will twist your trunk. 7. When holding your leg straight out becomes easier, try raising your opposite arm at the same time. Repeat steps 1 through 5. Balance-building exercise 1    1. Stand with a chair in front of you and a wall behind you, in case you lose your balance. 2. Stand with your feet together and your arms at your sides. 3. Move your head up and down 10 times. Balance-building exercise 2    1. Turn your head side to side 10 times. Balance-building exercise 3    1. Move your head diagonally up and down 10 times. Balance-building exercise 4    1. Move your head diagonally up and down 10 times on the other side.     Follow-up care is a key part of your treatment and safety. Be sure to make and go to all appointments, and call your doctor if you are having problems. It's also a good idea to know your test results and keep a list of the medicines you take. Where can you learn more? Go to http://erick-cas.info/. Enter O706 in the search box to learn more about \"Movement Disorders: Exercises. \"  Current as of: March 28, 2019  Content Version: 12.2  © 5586-9544 [employer]. Care instructions adapted under license by [employer] (which disclaims liability or warranty for this information). If you have questions about a medical condition or this instruction, always ask your healthcare professional. [address] any warranty or liability for your use of this information.

## 2019-10-15 NOTE — PROGRESS NOTES
Assessment and Plan    1. Parkinson's disease ([facility] [address]  Diagnosis discussed particularly adherence to medication schedule and following up with Neuro. Handouts given  Heart rate and rhythm doing OK off of beta blocker      Follow-up and Dispositions    · Return in about 6 months (around 4/15/2020) for Medication follow up. Diagnosis and plan discussed with patient who verbillized understanding. History of present Beryl Pisano is a 80 y.o. female presenting for Other (Follow-up visit with Dr Andres Walter )    Parkinson's  Asked her to follow up after seeing Dr. Neo Flor  Now on Sinemet and thinks it helps  No side effects or new sx      Review of Systems   Musculoskeletal: Negative for falls. Neurological: Positive for tremors.          Past Medical History:   Diagnosis Date    Benign essential tremor     GERD (gastroesophageal reflux disease)     Hyperlipidemia     Osteoarthritis      Past Surgical History:   Procedure Laterality Date    HX DILATION AND CURETTAGE  1984    x2    HX TONSILLECTOMY       Family History   Problem Relation Age of Onset    Cancer Brother 70        brain cancer    Cancer Brother 79        brain cancer    Cancer Brother 54        colon cancer     Social History     Socioeconomic History    Marital status:      Spouse name: Not on file    Number of children: Not on file    Years of education: Not on file    Highest education level: Not on file   Occupational History    Not on file   Social Needs    Financial resource strain: Not on file    Food insecurity:     Worry: Not on file     Inability: Not on file    Transportation needs:     Medical: Not on file     Non-medical: Not on file   Tobacco Use    Smoking status: Former Smoker     Packs/day: 2.00     Years: 30.00     Pack years: 60.00     Types: Cigarettes     Last attempt to quit: 1983     Years since quittin.5    Smokeless tobacco: Never Used   Substance and Sexual Activity    Alcohol use: Yes    Drug use: Never    Sexual activity: Not Currently   Lifestyle    Physical activity:     Days per week: Not on file     Minutes per session: Not on file    Stress: Not on file   Relationships    Social connections:     Talks on phone: Not on file     Gets together: Not on file     Attends Synagogue service: Not on file     Active member of club or organization: Not on file     Attends meetings of clubs or organizations: Not on file     Relationship status: Not on file    Intimate partner violence:     Fear of current or ex partner: Not on file     Emotionally abused: Not on file     Physically abused: Not on file     Forced sexual activity: Not on file   Other Topics Concern    Not on file   Social History Narrative    Not on file         Current Outpatient Medications   Medication Sig Dispense Refill    carbidopa-levodopa (SINEMET)  mg per tablet 1 po at 8am, 12noon and 4pm 270 Tab 3    simvastatin (ZOCOR) 40 mg tablet TAKE 1 TABLET AT BEDTIME 90 Tab 1    esomeprazole (NEXIUM) 40 mg capsule TAKE 1 CAPSULE DAILY 90 Cap 1    VENTOLIN HFA 90 mcg/actuation inhaler INHALE 1-2 PUFFS BY MOUTH EVERY 4-6 HOURS AS NEEDED FOR SHORTNESS OF BREATH,COUGH,WHEEZING 18 Inhaler 2    aspirin 81 mg tablet Take 81 mg by mouth. Allergies   Allergen Reactions    Sulfa (Sulfonamide Antibiotics) Itching       Vitals:    10/15/19 1048   BP: 123/72   Pulse: 67   Resp: 16   Temp: 97.7 °F (36.5 °C)   TempSrc: Core   SpO2: 97%   Weight: 165 lb (74.8 kg)   Height: 5' 4.5\" (1.638 m)     Body mass index is 27.88 kg/m². Objective  Physical Exam   Constitutional: She is oriented to person, place, and time. She appears well-developed and well-nourished. No distress. Eyes: Conjunctivae are normal.   Neck: Neck supple. No thyromegaly present. Cardiovascular: Normal rate, regular rhythm and normal heart sounds. Exam reveals no gallop and no friction rub. No murmur heard.   Pulmonary/Chest: Effort normal and breath sounds normal. No respiratory distress. She has no wheezes. She has no rales. Musculoskeletal: She exhibits no edema. Lymphadenopathy:     She has no cervical adenopathy. Neurological: She is alert and oriented to person, place, and time. Skin: She is not diaphoretic. Psychiatric: She has a normal mood and affect. Her behavior is normal. Judgment and thought content normal.   Nursing note and vitals reviewed. Minimal tremor left hand.

## 2020-01-06 ENCOUNTER — OFFICE VISIT (OUTPATIENT)
Dept: NEUROLOGY | Age: 85
End: 2020-01-06

## 2020-01-06 VITALS
DIASTOLIC BLOOD PRESSURE: 62 MMHG | SYSTOLIC BLOOD PRESSURE: 120 MMHG | HEIGHT: 65 IN | RESPIRATION RATE: 16 BRPM | HEART RATE: 64 BPM | OXYGEN SATURATION: 97 % | WEIGHT: 159 LBS | BODY MASS INDEX: 26.49 KG/M2

## 2020-01-06 DIAGNOSIS — G20 PARKINSONISM, UNSPECIFIED PARKINSONISM TYPE (HCC): Primary | ICD-10-CM

## 2020-01-06 RX ORDER — CARBIDOPA AND LEVODOPA 25; 100 MG/1; MG/1
TABLET ORAL
Qty: 405 TAB | Refills: 3 | Status: SHIPPED | OUTPATIENT
Start: 2020-01-06 | End: 2020-12-29

## 2020-01-06 NOTE — PROGRESS NOTES
[facility] Clinics and  [address] at [facility] Neurology Clinics at [address] [facility], [address] [facility] [address] [facility], [address] [place] [address]   [phone]              Chief Complaint   Patient presents with    Tremors     3 mo PD, not seeing much difference. Worsens with stress. Is wearing mouth guard at night to protect teeth grinding/jaw clench. Is noticing she will wake trying to grind front teeth against guard     Current Outpatient Medications   Medication Sig Dispense Refill    carbidopa-levodopa (SINEMET)  mg per tablet 1 po at 8am, 12noon and 4pm 270 Tab 3    simvastatin (ZOCOR) 40 mg tablet TAKE 1 TABLET AT BEDTIME 90 Tab 1    esomeprazole (NEXIUM) 40 mg capsule TAKE 1 CAPSULE DAILY 90 Cap 1    VENTOLIN HFA 90 mcg/actuation inhaler INHALE 1-2 PUFFS BY MOUTH EVERY 4-6 HOURS AS NEEDED FOR SHORTNESS OF BREATH,COUGH,WHEEZING 18 Inhaler 2    aspirin 81 mg tablet Take 81 mg by mouth. Allergies   Allergen Reactions    Sulfa (Sulfonamide Antibiotics) Itching     Social History     Tobacco Use    Smoking status: Former Smoker     Packs/day: 2.00     Years: 30.00     Pack years: 60.00     Types: Cigarettes     Last attempt to quit: 1983     Years since quittin.7    Smokeless tobacco: Never Used   Substance Use Topics    Alcohol use: Yes    Drug use: Never     Patient returns today for follow-up. She is an 71-year-old woman I see for Parkinson's. Last visit was in October. She had a positive response to Sinemet. We planned 3-month follow-up and if she was doing well we were going to stretch her out to every 6 month follow-up. She returns today noting she is not been taking the Sinemet on the 8 AM, 12 noon and 4 PM schedule is that she has been taking it when she eats. She is tolerating it.   She is getting tremor however more when she is holding the telephone and it clings on her earring and is bothersome. At night she has chin quiver that bothers her. No balance trouble. No difficulty walking. No shuffling. No falls. No chest pain. No palpitations. No shortness of breath    Review of systems  Pertinent positives and negatives as noted with remainder of comprehensive review negative    Examination  Visit Vitals  /62 (BP 1 Location: Left arm, BP Patient Position: Sitting)   Pulse 64   Resp 16   Ht 5' 4.5\" (1.638 m)   Wt 72.1 kg (159 lb)   SpO2 97%   BMI 26.87 kg/m²   Pleasant lady with appropriate dress grooming and affect. Awake alert oriented and conversant. Speech and language normal.  Cognition normal.  No icterus. No edema. She has minimal rest tremor today of the bilateral hands left greater than right with minimal cogwheeling left and none right. No ataxia. Steady gait. Impression/Plan  Parkinson's symptomatically a bit worse as noted. Discussed taking Sinemet on schedule. She will start taking at 8 AM, 12 noon and 4 PM.  We will also increase her dose from 1 tablet up to 1.5 tablets. Follow-up in about 6 weeks. Edith Calderon MD      This note was created using voice recognition software. Despite editing, there may be syntax errors. This note will not be viewable in [address]

## 2020-02-17 ENCOUNTER — OFFICE VISIT (OUTPATIENT)
Dept: NEUROLOGY | Age: 85
End: 2020-02-17

## 2020-02-17 VITALS
BODY MASS INDEX: 26.52 KG/M2 | RESPIRATION RATE: 18 BRPM | OXYGEN SATURATION: 96 % | DIASTOLIC BLOOD PRESSURE: 66 MMHG | SYSTOLIC BLOOD PRESSURE: 120 MMHG | WEIGHT: 159.2 LBS | HEIGHT: 65 IN | HEART RATE: 71 BPM

## 2020-02-17 DIAGNOSIS — G20 PARKINSONISM, UNSPECIFIED PARKINSONISM TYPE (HCC): Primary | ICD-10-CM

## 2020-02-17 DIAGNOSIS — I65.23 BILATERAL CAROTID ARTERY STENOSIS: ICD-10-CM

## 2020-02-17 DIAGNOSIS — I73.9 CLAUDICATION (HCC): ICD-10-CM

## 2020-02-17 RX ORDER — GLUCOSAMINE/CHONDR SU A SOD 750-600 MG
TABLET ORAL
COMMUNITY

## 2020-02-17 RX ORDER — RASAGILINE 1 MG/1
1 TABLET ORAL DAILY
Qty: 30 TAB | Refills: 3 | Status: SHIPPED | OUTPATIENT
Start: 2020-02-17 | End: 2020-05-29

## 2020-02-17 NOTE — PROGRESS NOTES
[facility] Neurology Clinics and  [address] at [facility] Neurology Clinics at [address] [facility], [address] [facility] [address] [facility], [address] [place] [address]   [phone]              Chief Complaint   Patient presents with    Tremors     unchanged with dose increase    Medication Evaluation    Leg Pain     LLE     Current Outpatient Medications   Medication Sig Dispense Refill    Biotin 2,500 mcg cap Take  by mouth.  esomeprazole (NEXIUM) 40 mg capsule TAKE 1 CAPSULE DAILY 90 Cap 2    simvastatin (ZOCOR) 40 mg tablet TAKE 1 TABLET AT BEDTIME 90 Tab 2    carbidopa-levodopa (SINEMET)  mg per tablet 1.5  po at 8am, 12noon and 4pm 405 Tab 3    aspirin 81 mg tablet Take 81 mg by mouth.  VENTOLIN HFA 90 mcg/actuation inhaler INHALE 1-2 PUFFS BY MOUTH EVERY 4-6 HOURS AS NEEDED FOR SHORTNESS OF BREATH,COUGH,WHEEZING 18 Inhaler 2      Allergies   Allergen Reactions    Sulfa (Sulfonamide Antibiotics) Itching     Social History     Tobacco Use    Smoking status: Former Smoker     Packs/day: 2.00     Years: 30.00     Pack years: 60.00     Types: Cigarettes     Last attempt to quit: 1983     Years since quittin.8    Smokeless tobacco: Never Used   Substance Use Topics    Alcohol use: Yes    Drug use: Never     Patient returns today for follow-up. She is an 54-year-old lady I see for Parkinson's. Last visit was 2020 and at that time she was doing a bit worse. We increased her dose of Sinemet from 1 tablet up to 1.5 tablets and have her take this on a schedule 8 AM, 12 noon and 4 PM.  Today she returns and indicates she still has some tremor particularly if she gets upset. It bothers her. She has difficulty going to a buffet. If she gets upset while on the phone she will have more tremor. Tolerating Sinemet. It does make her urine a bit darker but otherwise no issue.       She has a new complaint today and that is of claudication in the left leg. She notes that if she walks for any distance she will have pain. If she stops it goes away. She did do a [employer] screening and she is upset because they told her she has moderate blockage in her carotid artery. She does not know with the percentages. She is afraid of having a stroke. She has not had any stroke symptoms. She does have an appointment with vascular surgery about the leg upcoming but she is quite concerned about stroke. She has not had any palpitations. No chest pain. No shortness of breath. She has not been ill. No fall. No fever. No chills. Review of systems  Pertinent positives and negatives as noted with remainder of comprehensive review negative    Examination  Visit Vitals  Ht 5' 4.5\" (1.638 m)   Wt 72.2 kg (159 lb 3.2 oz)   BMI 26.90 kg/m²     Visit Vitals  /66   Pulse 71   Resp 18   Ht 5' 4.5\" (1.638 m)   Wt 72.2 kg (159 lb 3.2 oz)   SpO2 96%   BMI 26.90 kg/m²     She is a very pleasant lady. She is awake alert oriented and conversant. Speech and language are normal.  Cognition is normal.  She has no icterus. Symmetric pulses. I do not hear bruit. No edema. She has full versions without nystagmus. Face symmetric with symmetric facial sensation. Tongue and palate midline. She has rest tremor left hand with cogwheeling bilateral wrists left greater than right. She is not bradykinetic today. She has a good stride. Impression/Plan  Parkinson's disease still with some bothersome tremor. We discussed options such as just doing the course versus adding something. At the end of our discussion given that is bothersome we will add Azilect. We will do this in a customary fashion. Discussed administration, potential side effects, potential benefits and alternatives.   Continue with Sinemet as is 1.5 tablets 3 times daily at 8 AM, 12 noon and 4 PM    New complaint of claudication in the left leg--see surgery as planned    New complaint of carotid stenosis by [facility] screening. We will get a true Doppler today. If she is 16-49% we will do 1 yearly. If she is greater than that but less than 70% then we will do 1 every 6 month. If she is 70% or greater than she will address that with vascular surgery    Follow-up with me in 10-12 weeks      Rich Villar MD      This note was created using voice recognition software. Despite editing, there may be syntax errors. This note will not be viewable in [address]

## 2020-02-17 NOTE — PATIENT INSTRUCTIONS
PRESCRIPTION REFILL POLICY [facility] Statement to Patients April 1, 2014 In an effort to ensure the large volume of patient prescription refills is processed in the most efficient and expeditious manner, we are asking our patients to assist us by calling your Pharmacy for all prescription refills, this will include also your  Mail Order Pharmacy. The pharmacy will contact our office electronically to continue the refill process. Please do not wait until the last minute to call your pharmacy. We need at least 48 hours (2days) to fill prescriptions. We also encourage you to call your pharmacy before going to  your prescription to make sure it is ready. With regard to controlled substance prescription refill requests (narcotic refills) that need to be picked up at our office, we ask your cooperation by providing us with at least 72 hours (3days) notice that you will need a refill. We will not refill narcotic prescription refill requests after 4:00pm on any weekday, Monday through Thursday, or after 2:00pm on Fridays, or on the weekends. We encourage everyone to explore another way of getting your prescription refill request processed using Yoyo, our patient web portal through our electronic medical record system. Yoyo is an efficient and effective way to communicate your medication request directly to the office and  downloadable as an michael on your smart phone . Yoyo also features a review functionality that allows you to view your medication list as well as leave messages for your physician. Are you ready to get connected? If so please review the attatched instructions or speak to any of our staff to get you set up right away! Thank you so much for your cooperation. Should you have any questions please contact our Practice Administrator. The Physicians and Staff,  [facility] Continue taking Sinemet as you are 
 
 Start taking Azilect 1 mg tablet 1/2 tablet daily x1 week then increase to a full tablet daily thereafter The Azilect can take 8-10 weeks to get maximum effect so I will see you back in 10-12 weeks Have your Doppler done today

## 2020-02-27 ENCOUNTER — OFFICE VISIT (OUTPATIENT)
Dept: FAMILY MEDICINE CLINIC | Age: 85
End: 2020-02-27

## 2020-02-27 VITALS
TEMPERATURE: 97.9 F | OXYGEN SATURATION: 94 % | HEIGHT: 65 IN | SYSTOLIC BLOOD PRESSURE: 119 MMHG | BODY MASS INDEX: 25.66 KG/M2 | DIASTOLIC BLOOD PRESSURE: 71 MMHG | RESPIRATION RATE: 20 BRPM | WEIGHT: 154 LBS | HEART RATE: 85 BPM

## 2020-02-27 DIAGNOSIS — R68.89 FLU-LIKE SYMPTOMS: ICD-10-CM

## 2020-02-27 DIAGNOSIS — J43.8 OTHER EMPHYSEMA (HCC): ICD-10-CM

## 2020-02-27 DIAGNOSIS — R06.2 WHEEZING: Primary | ICD-10-CM

## 2020-02-27 DIAGNOSIS — J06.9 UPPER RESPIRATORY TRACT INFECTION, UNSPECIFIED TYPE: ICD-10-CM

## 2020-02-27 LAB
FLUAV+FLUBV AG NOSE QL IA.RAPID: NEGATIVE POS/NEG
FLUAV+FLUBV AG NOSE QL IA.RAPID: NEGATIVE POS/NEG
VALID INTERNAL CONTROL?: YES

## 2020-02-27 RX ORDER — PREDNISONE 10 MG/1
TABLET ORAL
Qty: 21 TAB | Refills: 0 | Status: SHIPPED | OUTPATIENT
Start: 2020-02-27 | End: 2020-08-11 | Stop reason: ALTCHOICE

## 2020-02-27 RX ORDER — AZITHROMYCIN 250 MG/1
TABLET, FILM COATED ORAL
Qty: 6 TAB | Refills: 0 | Status: SHIPPED | OUTPATIENT
Start: 2020-02-27 | End: 2020-03-03

## 2020-02-27 RX ORDER — BENZONATATE 200 MG/1
200 CAPSULE ORAL
Qty: 21 CAP | Refills: 1 | Status: SHIPPED | OUTPATIENT
Start: 2020-02-27 | End: 2020-03-05

## 2020-02-27 NOTE — PROGRESS NOTES
Patient stated name &     Chief Complaint   Patient presents with    Cold Symptoms     Started 10 days ago     Started with Laryngitis, runny nose, coughing, no chills      Still coughing & shortness of breath    Mucouse - Dark green     OTC Musinex & Delsym            Health Maintenance Due   Topic    DTaP/Tdap/Td series (1 - Tdap)    GLAUCOMA SCREENING Q2Y        Wt Readings from Last 3 Encounters:   20 154 lb (69.9 kg)   20 159 lb 3.2 oz (72.2 kg)   20 159 lb (72.1 kg)     Temp Readings from Last 3 Encounters:   20 97.9 °F (36.6 °C) (Oral)   10/15/19 97.7 °F (36.5 °C) (Core)   08/15/19 97.6 °F (36.4 °C) (Oral)     BP Readings from Last 3 Encounters:   20 119/71   20 120/66   20 120/62     Pulse Readings from Last 3 Encounters:   20 85   20 71   20 64         Learning Assessment:  :     Learning Assessment 2019   PRIMARY LEARNER Patient   HIGHEST LEVEL OF EDUCATION - PRIMARY LEARNER  SOME COLLEGE   BARRIERS PRIMARY LEARNER NONE   CO-LEARNER CAREGIVER No   PRIMARY LANGUAGE ENGLISH   LEARNER PREFERENCE PRIMARY LISTENING     READING     DEMONSTRATION   ANSWERED BY self   RELATIONSHIP SELF       Depression Screening:  :     3 most recent PHQ Screens 2020   Little interest or pleasure in doing things Not at all   Feeling down, depressed, irritable, or hopeless Not at all   Total Score PHQ 2 0       Fall Risk Assessment:  :     Fall Risk Assessment, last 12 mths 2020   Able to walk? Yes   Fall in past 12 months? No       Abuse Screening:  :     No flowsheet data found. Coordination of Care Questionnaire:  :     1) Have you been to an emergency room, urgent care clinic since your last visit? No    Hospitalized since your last visit? No             2) Have you seen or consulted any other health care providers outside of [address] [place] [address] since your last visit?  No  (Include any pap smears or colon screenings in this section.)    Patient is accompanied by self I have received verbal consent from Pushpa Elaine to discuss any/all medical information while they are present in the room.

## 2020-02-27 NOTE — PATIENT INSTRUCTIONS

## 2020-02-27 NOTE — PROGRESS NOTES
Assessment/Plan:     Diagnoses and all orders for this visit:    1. Wheezing  -     predniSONE (STERAPRED DS) 10 mg dose pack; See administration instruction per 10mg dose pack  - Unchanged, start prednisone taper today as directed. Follow up in 7 days. Reasons to seek urgent care discussed. 2. Upper respiratory tract infection, unspecified type  -     azithromycin (ZITHROMAX) 250 mg tablet; Take 2 tablets today, then take 1 tablet daily  -     benzonatate (TESSALON) 200 mg capsule; Take 1 Cap by mouth three (3) times daily as needed for Cough for up to 7 days.  -     AMB POC TRACI INFLUENZA A/B TEST  - Unchanged, start azithromycin today as directed, symptom management as discussed, tessalon for cough    3. Other emphysema (HCC)  - Albuterol inhaler as needed. Follow up in 7 days. 4. Flu-like symptoms  -     AMB POC TRACI INFLUENZA A/B TEST  - negative        Follow-up and Dispositions    · Return in about 1 week (around 3/5/2020) for Follow Up. Discussed expected course/resolution/complications of diagnosis in detail with patient. Medication risks/benefits/costs/interactions/alternatives discussed with patient. Pt was given after visit summary which includes diagnoses, current medications & vitals. Pt expressed understanding with the diagnosis and plan        Subjective:      Raghavendra Enriquez is a 80 y.o. female who presents for had concerns including Cold Symptoms (Started 10 days ago     Started with Laryngitis, runny nose, coughing, no chills      Still coughing & shortness of breath    Mucouse - Dark green     OTC Musinex & Delsym    ). Upper Respiratory Infection  Patient complains of symptoms of a URI. Symptoms include congestion and cough. Onset of symptoms was 10 days ago, unchanged since that time. She also c/o congestion, productive cough with  green colored sputum and shortness of breath for the past 10 days . She is drinking plenty of fluids. . Evaluation to date: none. Treatment to date: OTC products. Current Outpatient Medications   Medication Sig Dispense Refill    predniSONE (STERAPRED DS) 10 mg dose pack See administration instruction per 10mg dose pack 21 Tab 0    azithromycin (ZITHROMAX) 250 mg tablet Take 2 tablets today, then take 1 tablet daily 6 Tab 0    benzonatate (TESSALON) 200 mg capsule Take 1 Cap by mouth three (3) times daily as needed for Cough for up to 7 days. 21 Cap 1    Biotin 2,500 mcg cap Take  by mouth.  rasagiline (AZILECT) 1 mg tablet Take 1 Tab by mouth daily. 30 Tab 3    esomeprazole (NEXIUM) 40 mg capsule TAKE 1 CAPSULE DAILY 90 Cap 2    simvastatin (ZOCOR) 40 mg tablet TAKE 1 TABLET AT BEDTIME 90 Tab 2    carbidopa-levodopa (SINEMET)  mg per tablet 1.5  po at 8am, 12noon and 4pm 405 Tab 3    VENTOLIN HFA 90 mcg/actuation inhaler INHALE 1-2 PUFFS BY MOUTH EVERY 4-6 HOURS AS NEEDED FOR SHORTNESS OF BREATH,COUGH,WHEEZING 18 Inhaler 2    aspirin 81 mg tablet Take 81 mg by mouth.            Allergies   Allergen Reactions    Sulfa (Sulfonamide Antibiotics) Itching     Past Medical History:   Diagnosis Date    Benign essential tremor     GERD (gastroesophageal reflux disease)     Hyperlipidemia     Osteoarthritis      Past Surgical History:   Procedure Laterality Date    HX DILATION AND CURETTAGE  1984    x2    HX TONSILLECTOMY  1957     Family History   Problem Relation Age of Onset    Cancer Brother 70        brain cancer    Cancer Brother 79        brain cancer    Cancer Brother 54        colon cancer     Social History     Socioeconomic History    Marital status:      Spouse name: Not on file    Number of children: Not on file    Years of education: Not on file    Highest education level: Not on file   Occupational History    Not on file   Social Needs    Financial resource strain: Not on file    Food insecurity:     Worry: Not on file     Inability: Not on file    Transportation needs:     Medical: Not on file     Non-medical: Not on file   Tobacco Use    Smoking status: Former Smoker     Packs/day: 2.00     Years: 30.00     Pack years: 60.00     Types: Cigarettes     Last attempt to quit: 1983     Years since quittin.9    Smokeless tobacco: Never Used   Substance and Sexual Activity    Alcohol use: Yes    Drug use: Never    Sexual activity: Not Currently   Lifestyle    Physical activity:     Days per week: Not on file     Minutes per session: Not on file    Stress: Not on file   Relationships    Social connections:     Talks on phone: Not on file     Gets together: Not on file     Attends Jew service: Not on file     Active member of club or organization: Not on file     Attends meetings of clubs or organizations: Not on file     Relationship status: Not on file    Intimate partner violence:     Fear of current or ex partner: Not on file     Emotionally abused: Not on file     Physically abused: Not on file     Forced sexual activity: Not on file   Other Topics Concern    Not on file   Social History Narrative    Not on file       HPI      ROS:   Review of Systems   Constitutional: Negative for chills, fever and malaise/fatigue. HENT: Positive for congestion. Negative for ear pain and sinus pain. Eyes: Negative for blurred vision. Respiratory: Positive for cough, sputum production and shortness of breath. Cardiovascular: Negative for chest pain, palpitations and leg swelling. Neurological: Negative for dizziness and headaches. Objective:     Visit Vitals  /71   Pulse 85   Temp 97.9 °F (36.6 °C) (Oral)   Resp 20   Ht 5' 4.5\" (1.638 m)   Wt 154 lb (69.9 kg)   SpO2 94%   BMI 26.03 kg/m²         Vitals and Nurse Documentation reviewed. Physical Exam  Constitutional:       General: She is not in acute distress. HENT:      Right Ear: No middle ear effusion. Tympanic membrane is not erythematous or bulging. Left Ear:  No middle ear effusion.  Tympanic membrane is not erythematous or bulging. Nose: No mucosal edema. Right Sinus: No maxillary sinus tenderness or frontal sinus tenderness. Left Sinus: No maxillary sinus tenderness or frontal sinus tenderness. Mouth/Throat:      Pharynx: No oropharyngeal exudate or posterior oropharyngeal erythema. Cardiovascular:      Heart sounds: S1 normal and S2 normal. No murmur. No friction rub. No gallop. Pulmonary:      Effort: No respiratory distress. Breath sounds: Examination of the right-upper field reveals wheezing. Wheezing present. Lymphadenopathy:      Cervical: No cervical adenopathy. Neurological:      Mental Status: She is oriented to person, place, and time.          Results for orders placed or performed in visit on 02/27/20   AMB POC TRACI INFLUENZA A/B TEST   Result Value Ref Range    VALID INTERNAL CONTROL POC Yes     Influenza A Ag POC Negative Negative Pos/Neg    Influenza B Ag POC Negative Negative Pos/Neg

## 2020-05-29 RX ORDER — RASAGILINE 1 MG/1
TABLET ORAL
Qty: 30 TAB | Refills: 11 | Status: SHIPPED | OUTPATIENT
Start: 2020-05-29 | End: 2020-08-11 | Stop reason: ALTCHOICE

## 2020-08-11 ENCOUNTER — OFFICE VISIT (OUTPATIENT)
Dept: NEUROLOGY | Age: 85
End: 2020-08-11
Payer: MEDICARE

## 2020-08-11 VITALS
HEART RATE: 72 BPM | DIASTOLIC BLOOD PRESSURE: 52 MMHG | WEIGHT: 146.2 LBS | TEMPERATURE: 97.4 F | BODY MASS INDEX: 24.96 KG/M2 | HEIGHT: 64 IN | OXYGEN SATURATION: 94 % | RESPIRATION RATE: 14 BRPM | SYSTOLIC BLOOD PRESSURE: 104 MMHG

## 2020-08-11 DIAGNOSIS — G20 PARKINSONISM, UNSPECIFIED PARKINSONISM TYPE (HCC): Primary | ICD-10-CM

## 2020-08-11 PROCEDURE — G8419 CALC BMI OUT NRM PARAM NOF/U: HCPCS | Performed by: PSYCHIATRY & NEUROLOGY

## 2020-08-11 PROCEDURE — 99214 OFFICE O/P EST MOD 30 MIN: CPT | Performed by: PSYCHIATRY & NEUROLOGY

## 2020-08-11 PROCEDURE — 1101F PT FALLS ASSESS-DOCD LE1/YR: CPT | Performed by: PSYCHIATRY & NEUROLOGY

## 2020-08-11 PROCEDURE — G8427 DOCREV CUR MEDS BY ELIG CLIN: HCPCS | Performed by: PSYCHIATRY & NEUROLOGY

## 2020-08-11 PROCEDURE — 1090F PRES/ABSN URINE INCON ASSESS: CPT | Performed by: PSYCHIATRY & NEUROLOGY

## 2020-08-11 PROCEDURE — G8536 NO DOC ELDER MAL SCRN: HCPCS | Performed by: PSYCHIATRY & NEUROLOGY

## 2020-08-11 PROCEDURE — G8510 SCR DEP NEG, NO PLAN REQD: HCPCS | Performed by: PSYCHIATRY & NEUROLOGY

## 2020-08-11 RX ORDER — GLUCOSAMINE SULFATE 1500 MG
POWDER IN PACKET (EA) ORAL DAILY
COMMUNITY

## 2020-08-11 RX ORDER — ENTACAPONE 200 MG/1
200 TABLET ORAL 3 TIMES DAILY
Qty: 90 TAB | Refills: 2 | Status: SHIPPED | OUTPATIENT
Start: 2020-08-11 | End: 2020-09-21

## 2020-08-11 NOTE — PROGRESS NOTES
[facility] Neurology Clinics and  [address] at [facility] Neurology Clinics at [address] [facility], [address] [facility] [address] [facility], [address] [place] [address]   [phone]              Chief Complaint   Patient presents with    Tremors     Current Outpatient Medications   Medication Sig Dispense Refill    cholecalciferol (Vitamin D3) 25 mcg (1,000 unit) cap Take  by mouth daily.  rasagiline (AZILECT) 1 mg tablet TAKE 1 TABLET DAILY 30 Tab 11    Biotin 2,500 mcg cap Take  by mouth.  esomeprazole (NEXIUM) 40 mg capsule TAKE 1 CAPSULE DAILY 90 Cap 2    simvastatin (ZOCOR) 40 mg tablet TAKE 1 TABLET AT BEDTIME 90 Tab 2    carbidopa-levodopa (SINEMET)  mg per tablet 1.5  po at 8am, 12noon and 4pm 405 Tab 3    VENTOLIN HFA 90 mcg/actuation inhaler INHALE 1-2 PUFFS BY MOUTH EVERY 4-6 HOURS AS NEEDED FOR SHORTNESS OF BREATH,COUGH,WHEEZING 18 Inhaler 2    aspirin 81 mg tablet Take 81 mg by mouth. Allergies   Allergen Reactions    Sulfa (Sulfonamide Antibiotics) Itching     Social History     Tobacco Use    Smoking status: Former Smoker     Packs/day: 2.00     Years: 30.00     Pack years: 60.00     Types: Cigarettes     Last attempt to quit: 1983     Years since quittin.3    Smokeless tobacco: Never Used   Substance Use Topics    Alcohol use: Yes    Drug use: Never     51-year-old woman returns today in follow-up. I see her for Parkinson's. Last visit with me was February of this year and at that time we added some Azilect and continued Sinemet 1.5 tablet 3 times daily. She returns today and reports since starting the Azilect she is not noticed much of a change in her tremor although it may be a bit better.   She has had dry mouth to the point that it affects her speech and she has had increasing constipation    Examination  Visit Vitals  /52 (BP 1 Location: Left arm, BP Patient Position: Sitting) Pulse 72   Temp 97.4 °F (36.3 °C)   Resp 14   Ht 5' 4\" (1.626 m)   Wt 66.3 kg (146 lb 3.2 oz)   SpO2 94%   BMI 25.10 kg/m²     A pleasant lady. Awake alert oriented and conversant. Speech and language normal.  Cognition normal.  Cranial nerves intact 2-12. No tremor of the upper extremities today. Mild left lower extremity tremor. Steady gait    Impression/Plan  Parkinson's disease with side effect to Azilect. We discussed several options today. At the end of our conversation we decided to go ahead and stop the Azilect. I printed a prescription for Comtan 200 mg tablets and she will take 1 of those 3 times daily with her Sinemet if she chooses. I asked her to stay off the Azilect for about 2 weeks and if her tremor is tolerable then we will just take a wait-and-see approach and see her back in 4-6 months. If her tremor is not tolerable she will start the Comtan. We discussed potential side effects including discoloration of her urine. She will call my office to let me know whether she wants to start the [address] and if so we will see her in 6 weeks and if she decides not to then we will see her back in 4-6 months    Kelly Castellanos MD    Total time: 25 min   Counseling / coordination time: 15 min   > 50% counseling / coordination?: Yes re: as documented above      This note was created using voice recognition software. Despite editing, there may be syntax errors. This note will not be viewable in [address]

## 2020-08-11 NOTE — PATIENT INSTRUCTIONS
Stop taking the Azilect (rasagaline) I printed a prescription for Comtan (entacapone) and after you have been off of the Azilect x2 weeks, if you feel like the tremors are still bothersome then start taking 1 Comtan tablet with each dose of your Sinemet (carbidopa/levodopa) 3 times daily. If after being off of the Azilect x2 weeks you do not feel like you need any additional medication, meaning your tremors are tolerable and you are happy with how you are doing, then just tear up the Comtan prescription. Please call my nurse, Francie Calero, and let her know whether you started the Comtan or not. If you started the Comtan then I would like to see you in about 6 weeks. If you decide not to start the Comtan, then we will have you come in for follow-up in about 4 to 6 months.

## 2020-08-11 NOTE — PROGRESS NOTES
Chief Complaint   Patient presents with    Tremors     Some days good, some days worse. Feels it may be r/t stress.   C/o \"terrible dry mouth and constipation since I have been on new medicine\"  Azilect

## 2020-09-21 ENCOUNTER — OFFICE VISIT (OUTPATIENT)
Dept: FAMILY MEDICINE CLINIC | Age: 85
End: 2020-09-21
Payer: MEDICARE

## 2020-09-21 VITALS
SYSTOLIC BLOOD PRESSURE: 143 MMHG | BODY MASS INDEX: 24.86 KG/M2 | RESPIRATION RATE: 16 BRPM | HEART RATE: 60 BPM | HEIGHT: 64 IN | WEIGHT: 145.6 LBS | OXYGEN SATURATION: 95 % | TEMPERATURE: 97.5 F | DIASTOLIC BLOOD PRESSURE: 64 MMHG

## 2020-09-21 DIAGNOSIS — E78.00 HYPERCHOLESTEREMIA: ICD-10-CM

## 2020-09-21 DIAGNOSIS — Z00.00 MEDICARE ANNUAL WELLNESS VISIT, SUBSEQUENT: Primary | ICD-10-CM

## 2020-09-21 PROBLEM — I65.23 BILATERAL CAROTID ARTERY STENOSIS: Status: ACTIVE | Noted: 2020-09-21

## 2020-09-21 PROBLEM — G20 PARKINSON'S DISEASE (HCC): Status: ACTIVE | Noted: 2020-09-21

## 2020-09-21 LAB
ALBUMIN SERPL-MCNC: 3.8 G/DL (ref 3.5–5)
ALBUMIN/GLOB SERPL: 1.3 {RATIO} (ref 1.1–2.2)
ALP SERPL-CCNC: 70 U/L (ref 45–117)
ALT SERPL-CCNC: 12 U/L (ref 12–78)
ANION GAP SERPL CALC-SCNC: 3 MMOL/L (ref 5–15)
AST SERPL-CCNC: 12 U/L (ref 15–37)
BILIRUB DIRECT SERPL-MCNC: 0.3 MG/DL (ref 0–0.2)
BILIRUB SERPL-MCNC: 0.8 MG/DL (ref 0.2–1)
BUN SERPL-MCNC: 14 MG/DL (ref 6–20)
BUN/CREAT SERPL: 14 (ref 12–20)
CALCIUM SERPL-MCNC: 9.3 MG/DL (ref 8.5–10.1)
CHLORIDE SERPL-SCNC: 109 MMOL/L (ref 97–108)
CHOLEST SERPL-MCNC: 135 MG/DL
CO2 SERPL-SCNC: 30 MMOL/L (ref 21–32)
COMMENT, HOLDF: NORMAL
CREAT SERPL-MCNC: 0.99 MG/DL (ref 0.55–1.02)
GLOBULIN SER CALC-MCNC: 3 G/DL (ref 2–4)
GLUCOSE SERPL-MCNC: 92 MG/DL (ref 65–100)
HDLC SERPL-MCNC: 79 MG/DL
HDLC SERPL: 1.7 {RATIO} (ref 0–5)
LDLC SERPL CALC-MCNC: 35.2 MG/DL (ref 0–100)
LIPID PROFILE,FLP: NORMAL
POTASSIUM SERPL-SCNC: 5.6 MMOL/L (ref 3.5–5.1)
PROT SERPL-MCNC: 6.8 G/DL (ref 6.4–8.2)
SAMPLES BEING HELD,HOLD: NORMAL
SODIUM SERPL-SCNC: 142 MMOL/L (ref 136–145)
TRIGL SERPL-MCNC: 104 MG/DL (ref ?–150)
VLDLC SERPL CALC-MCNC: 20.8 MG/DL

## 2020-09-21 PROCEDURE — G0439 PPPS, SUBSEQ VISIT: HCPCS | Performed by: FAMILY MEDICINE

## 2020-09-21 PROCEDURE — G8420 CALC BMI NORM PARAMETERS: HCPCS | Performed by: FAMILY MEDICINE

## 2020-09-21 PROCEDURE — G8510 SCR DEP NEG, NO PLAN REQD: HCPCS | Performed by: FAMILY MEDICINE

## 2020-09-21 PROCEDURE — G8427 DOCREV CUR MEDS BY ELIG CLIN: HCPCS | Performed by: FAMILY MEDICINE

## 2020-09-21 PROCEDURE — 1101F PT FALLS ASSESS-DOCD LE1/YR: CPT | Performed by: FAMILY MEDICINE

## 2020-09-21 PROCEDURE — G8536 NO DOC ELDER MAL SCRN: HCPCS | Performed by: FAMILY MEDICINE

## 2020-09-21 NOTE — PROGRESS NOTES
This is the Subsequent Medicare Annual Wellness Exam, performed 12 months or more after the Initial AWV or the last Subsequent AWV    I have reviewed the patient's medical history in detail and updated the computerized patient record. History     Patient Active Problem List   Diagnosis Code    GERD (gastroesophageal reflux disease) K21.9    Hypercholesteremia E78.00    Osteoarthritis of knee M17.10    Paronychia of finger L03.019    Dyspnea R06.00    Other emphysema ([facility] [address] J43.8    Parkinson's disease ([facility] [address] Sunshine Burkett     Past Medical History:   Diagnosis Date    Benign essential tremor     GERD (gastroesophageal reflux disease)     Hyperlipidemia     Osteoarthritis       Past Surgical History:   Procedure Laterality Date    HX DILATION AND CURETTAGE  1984    x2    HX TONSILLECTOMY  1957     Current Outpatient Medications   Medication Sig Dispense Refill    sennosides (LAXATIVE PO) Take  by mouth.  diph,pertuss,acel,,tetanus vac,PF, (ADACEL) 2 Lf-(2.5-5-3-5 mcg)-5Lf/0.5 mL syrg vaccine 0.5 mL by IntraMUSCular route once for 1 dose. 0.5 mL 0    cholecalciferol (Vitamin D3) 25 mcg (1,000 unit) cap Take  by mouth daily.  Biotin 2,500 mcg cap Take  by mouth.  esomeprazole (NEXIUM) 40 mg capsule TAKE 1 CAPSULE DAILY 90 Cap 2    simvastatin (ZOCOR) 40 mg tablet TAKE 1 TABLET AT BEDTIME 90 Tab 2    carbidopa-levodopa (SINEMET)  mg per tablet 1.5  po at 8am, 12noon and 4pm 405 Tab 3    VENTOLIN HFA 90 mcg/actuation inhaler INHALE 1-2 PUFFS BY MOUTH EVERY 4-6 HOURS AS NEEDED FOR SHORTNESS OF BREATH,COUGH,WHEEZING 18 Inhaler 2    aspirin 81 mg tablet Take 81 mg by mouth.          Allergies   Allergen Reactions    Sulfa (Sulfonamide Antibiotics) Itching       Family History   Problem Relation Age of Onset    Cancer Brother 70        brain cancer    Cancer Brother 79        brain cancer    Cancer Brother 54        colon cancer     Social History     Tobacco Use    Smoking status: Former Smoker     Packs/day: 2.00     Years: 30.00     Pack years: 60.00     Types: Cigarettes     Last attempt to quit: 1983     Years since quittin.4    Smokeless tobacco: Never Used   Substance Use Topics    Alcohol use: Yes       Depression Risk Factor Screening:     3 most recent PHQ Screens 2020   Little interest or pleasure in doing things Not at all   Feeling down, depressed, irritable, or hopeless Not at all   Total Score PHQ 2 0       Alcohol Risk Screen   Do you average more than 1 drink per night or more than 7 drinks a week: No    On any one occasion in the past three months have you have had more than 3 drinks containing alcohol:  No        Functional Ability and Level of Safety:   Hearing: Hearing is good. Vision: eye appt with Dr. Corwin Moore in August  Activities of Daily Living: The home contains: handrails  Patient does total self care     Ambulation: with no difficulty     Fall Risk:  Fall Risk Assessment, last 12 mths 2020   Able to walk? Yes   Fall in past 12 months? No     Abuse Screen:  Patient is not abused       Cognitive Screening   Has your family/caregiver stated any concerns about your memory: no     Cognitive Screening: interview    Patient Care Team   Patient Care Team:  Justin Pillai MD as PCP - General (Family Medicine)  Justin Pillai MD as PCP - REHABILITATION [facility] Empaneled Provider  Mary Mayfield MD (Neurology)    Assessment/Plan   Education and counseling provided:  Are appropriate based on today's review and evaluation  End-of-Life planning (with patient's consent)    Seeing NEURO for Parkinsons  Stable carotid stenosis    ROS negative for chest pain, + HARTMAN  No blood urine or stool    General Well appearing, A&O X 4  Neck without nodes normal thyroid  Lungs clear to ausculation  CV RRR, No M, R, or G. No edema. Neuro Normal Speech, pill rolling left hand. Psych Oriented to person place and time with normal affect and mood.   No hallucinations or abnormal thought    Diagnoses and all orders for this visit:    1. Medicare annual wellness visit, subsequent  -     diph,pertuss,acel,,tetanus vac,PF, (ADACEL) 2 Lf-(2.5-5-3-5 mcg)-5Lf/0.5 mL syrg vaccine; 0.5 mL by IntraMUSCular route once for 1 dose. 2. Hypercholesteremia  -     LIPID PANEL; Future  -     METABOLIC PANEL, BASIC; Future  -     HEPATIC FUNCTION PANEL;  Future    Vision records requested  Labs updated    Health Maintenance Due   Topic Date Due    DTaP/Tdap/Td series (1 - Tdap) 08/22/1952    GLAUCOMA SCREENING Q2Y  08/22/1996    Medicare Yearly Exam  08/15/2020    Lipid Screen  08/15/2020    Flu Vaccine (1) 09/01/2020

## 2020-09-21 NOTE — PROGRESS NOTES
1. Have you been to the ER, urgent care clinic since your last visit? Hospitalized since your last visit? No    2. Have you seen or consulted any other health care providers outside of the [address] [place] [address] since your last visit? Include any pap smears or colon screening. No     Pharmacy verified.      Chief Complaint   Patient presents with    Complete Physical     Visit Vitals  BP (!) 143/64 (BP 1 Location: Left arm, BP Patient Position: Sitting)   Pulse 60   Temp 97.5 °F (36.4 °C) (Oral)   Resp 16   Ht 5' 4\" (1.626 m)   Wt 145 lb 9.6 oz (66 kg)   SpO2 95%   BMI 24.99 kg/m²     Health Maintenance Due   Topic Date Due    DTaP/Tdap/Td series (1 - Tdap) 08/22/1952    GLAUCOMA SCREENING Q2Y  08/22/1996    Bone Densitometry (Dexa) Screening  08/22/1996    Medicare Yearly Exam  08/15/2020    Lipid Screen  08/15/2020    Flu Vaccine (1) 09/01/2020

## 2020-09-21 NOTE — PATIENT INSTRUCTIONS
Medicare Wellness Visit, Female The best way to live healthy is to have a lifestyle where you eat a well-balanced diet, exercise regularly, limit alcohol use, and quit all forms of tobacco/nicotine, if applicable. Regular preventive services are another way to keep healthy. Preventive services (vaccines, screening tests, monitoring & exams) can help personalize your care plan, which helps you manage your own care. Screening tests can find health problems at the earliest stages, when they are easiest to treat. [facility] follows the current, evidence-based guidelines published by the [facility] ([facility]) when recommending preventive services for our patients. Because we follow these guidelines, sometimes recommendations change over time as research supports it. (For example, mammograms used to be recommended annually. Even though Medicare will still pay for an annual mammogram, the newer guidelines recommend a mammogram every two years for women of average risk). Of course, you and your doctor may decide to screen more often for some diseases, based on your risk and your co-morbidities (chronic disease you are already diagnosed with). Preventive services for you include: - Medicare offers their members a free annual wellness visit, which is time for you and your primary care provider to discuss and plan for your preventive service needs. Take advantage of this benefit every year! 
-All adults over the age of 72 should receive the recommended pneumonia vaccines. Current USPSTF guidelines recommend a series of two vaccines for the best pneumonia protection.  
-All adults should have a flu vaccine yearly and a tetanus vaccine every 10 years.  
-All adults age 48 and older should receive the shingles vaccines (series of two vaccines). -All adults age 38-68 who are overweight should have a diabetes screening test once every three years. -All adults born between [birth year] and 1965 should be screened once for Hepatitis C. 
-Other screening tests and preventive services for persons with diabetes include: an eye exam to screen for diabetic retinopathy, a kidney function test, a foot exam, and stricter control over your cholesterol.  
-Cardiovascular screening for adults with routine risk involves an electrocardiogram (ECG) at intervals determined by your doctor.  
-Colorectal cancer screenings should be done for adults age 54-65 with no increased risk factors for colorectal cancer. There are a number of acceptable methods of screening for this type of cancer. Each test has its own benefits and drawbacks. Discuss with your doctor what is most appropriate for you during your annual wellness visit. The different tests include: colonoscopy (considered the best screening method), a fecal occult blood test, a fecal DNA test, and sigmoidoscopy. 
 
-A bone mass density test is recommended when a woman turns 65 to screen for osteoporosis. This test is only recommended one time, as a screening. Some providers will use this same test as a disease monitoring tool if you already have osteoporosis. -Breast cancer screenings are recommended every other year for women of normal risk, age 54-69. 
-Cervical cancer screenings for women over age 72 are only recommended with certain risk factors. Here is a list of your current Health Maintenance items (your personalized list of preventive services) with a due date: 
Health Maintenance Due Topic Date Due  
 DTaP/Tdap/Td  (1 - Tdap) 08/22/1952  Glaucoma Screening   08/22/1996  Bone Mineral Density   08/22/1996 [address] [place] [address] Annual Well Visit  08/15/2020  Cholesterol Test   08/15/2020  Yearly Flu Vaccine (1) 09/01/2020

## 2020-09-22 ENCOUNTER — TELEPHONE (OUTPATIENT)
Dept: FAMILY MEDICINE CLINIC | Age: 85
End: 2020-09-22

## 2020-09-22 DIAGNOSIS — E87.5 HYPERKALEMIA: Primary | ICD-10-CM

## 2020-09-22 NOTE — TELEPHONE ENCOUNTER
----- Message from Roxanne Metz MD sent at 9/22/2020  8:26 AM EDT -----  Call patient. Her labs are fine except for an elevated potassium. This is likely to be a falsely elevated test but we should repeat the test to be sure. I will leave a order to recheck the potassium with the lab and will send her a copy of the lab tests.

## 2020-09-22 NOTE — TELEPHONE ENCOUNTER
Name and  verified    TC to Mrs. Miguel A Santiago- with recent lab results, patient will come to get repeat K+ check this week

## 2020-09-22 NOTE — PROGRESS NOTES
Call patient. Her labs are fine except for an elevated potassium. This is likely to be a falsely elevated test but we should repeat the test to be sure. I will leave a order to recheck the potassium with the lab and will send her a copy of the lab tests.

## 2020-09-23 DIAGNOSIS — E87.5 HYPERKALEMIA: ICD-10-CM

## 2020-09-23 LAB — POTASSIUM SERPL-SCNC: 4.2 MMOL/L (ref 3.5–5.1)

## 2020-09-25 ENCOUNTER — TELEPHONE (OUTPATIENT)
Dept: FAMILY MEDICINE CLINIC | Age: 85
End: 2020-09-25

## 2020-09-25 NOTE — TELEPHONE ENCOUNTER
----- Message from Anthony Pitts MD sent at 9/24/2020  8:18 AM EDT -----  Call her.   Repeat potassium was normal.  RH

## 2020-11-02 RX ORDER — ESOMEPRAZOLE MAGNESIUM 40 MG/1
CAPSULE, DELAYED RELEASE ORAL
Qty: 90 CAP | Refills: 3 | Status: SHIPPED | OUTPATIENT
Start: 2020-11-02 | End: 2021-11-11

## 2020-12-28 ENCOUNTER — TELEPHONE (OUTPATIENT)
Dept: NEUROLOGY | Age: 85
End: 2020-12-28

## 2020-12-28 NOTE — TELEPHONE ENCOUNTER
Patient needs Sinemet refill sent to [facility]. She said they reached out to us with no response.

## 2020-12-29 RX ORDER — CARBIDOPA AND LEVODOPA 25; 100 MG/1; MG/1
TABLET ORAL
Qty: 405 TAB | Refills: 3 | Status: SHIPPED | OUTPATIENT
Start: 2020-12-29 | End: 2021-08-09 | Stop reason: SDUPTHER

## 2021-02-08 ENCOUNTER — OFFICE VISIT (OUTPATIENT)
Dept: NEUROLOGY | Age: 86
End: 2021-02-08
Payer: MEDICARE

## 2021-02-08 VITALS
HEIGHT: 64 IN | WEIGHT: 145 LBS | BODY MASS INDEX: 24.75 KG/M2 | DIASTOLIC BLOOD PRESSURE: 68 MMHG | HEART RATE: 71 BPM | RESPIRATION RATE: 16 BRPM | TEMPERATURE: 97.5 F | OXYGEN SATURATION: 98 % | SYSTOLIC BLOOD PRESSURE: 142 MMHG

## 2021-02-08 DIAGNOSIS — G20 PARKINSONISM, UNSPECIFIED PARKINSONISM TYPE (HCC): Primary | ICD-10-CM

## 2021-02-08 PROCEDURE — 1090F PRES/ABSN URINE INCON ASSESS: CPT | Performed by: PSYCHIATRY & NEUROLOGY

## 2021-02-08 PROCEDURE — 1101F PT FALLS ASSESS-DOCD LE1/YR: CPT | Performed by: PSYCHIATRY & NEUROLOGY

## 2021-02-08 PROCEDURE — G8420 CALC BMI NORM PARAMETERS: HCPCS | Performed by: PSYCHIATRY & NEUROLOGY

## 2021-02-08 PROCEDURE — G8536 NO DOC ELDER MAL SCRN: HCPCS | Performed by: PSYCHIATRY & NEUROLOGY

## 2021-02-08 PROCEDURE — G8432 DEP SCR NOT DOC, RNG: HCPCS | Performed by: PSYCHIATRY & NEUROLOGY

## 2021-02-08 PROCEDURE — G8427 DOCREV CUR MEDS BY ELIG CLIN: HCPCS | Performed by: PSYCHIATRY & NEUROLOGY

## 2021-02-08 PROCEDURE — 99213 OFFICE O/P EST LOW 20 MIN: CPT | Performed by: PSYCHIATRY & NEUROLOGY

## 2021-02-08 NOTE — PROGRESS NOTES
Chief Complaint   Patient presents with    Parkinsons Disease     States sx vary, feels it is r/t stress/tension

## 2021-02-08 NOTE — PROGRESS NOTES
[address] [facility] Neurology Clinics and  [address] at [facility] Neurology Clinics at [address] [facility], [address] [facility] [address] [facility], [address] [place] [address]   [phone]              Chief Complaint   Patient presents with    Parkinsons Disease     Current Outpatient Medications   Medication Sig Dispense Refill    carbidopa-levodopa (SINEMET)  mg per tablet TAKE ONE AND ONE-HALF TABLETS AT 8 A. M., 12 NOON, AND 4 P.M. 405 Tab 3    simvastatin (ZOCOR) 40 mg tablet TAKE 1 TABLET AT BEDTIME 90 Tab 3    esomeprazole (NEXIUM) 40 mg capsule TAKE 1 CAPSULE DAILY 90 Cap 3    sennosides (LAXATIVE PO) Take  by mouth.  cholecalciferol (Vitamin D3) 25 mcg (1,000 unit) cap Take  by mouth daily.  Biotin 2,500 mcg cap Take  by mouth.  VENTOLIN HFA 90 mcg/actuation inhaler INHALE 1-2 PUFFS BY MOUTH EVERY 4-6 HOURS AS NEEDED FOR SHORTNESS OF BREATH,COUGH,WHEEZING 18 Inhaler 2    aspirin 81 mg tablet Take 81 mg by mouth. Allergies   Allergen Reactions    Sulfa (Sulfonamide Antibiotics) Itching     Social History     Tobacco Use    Smoking status: Former Smoker     Packs/day: 2.00     Years: 30.00     Pack years: 60.00     Types: Cigarettes     Quit date: 1983     Years since quittin.8    Smokeless tobacco: Never Used   Substance Use Topics    Alcohol use: Yes    Drug use: Never     51-year-old lady returns today for follow-up Parkinson's disease. Last visit with me was August of last year. She had side effect of Azilect. We started Comtan along with her Sinemet. She reports she did not notice much difference so she discontinued it. Overall she is doing well with just the Sinemet and she is happy with how she is doing. Some morning she is a little more anxious and the tremor is worse.     Examination  Visit Vitals  BP (!) 142/68 (BP 1 Location: Left upper arm, BP Patient Position: Sitting, BP Cuff Size: Adult)   Pulse 71   Temp 97.5 °F (36.4 °C)   Resp 16   Ht 5' 4\" (1.626 m)   Wt 65.8 kg (145 lb)   SpO2 98%   BMI 24.89 kg/m²     She is a very pleasant lady who is awake alert oriented and engaging. No icterus. Minimal rest tremor of the right hand and I am seeing her right at the end of her dosing interval.  No cogwheeling. Good hand opening closing. Steady    Impression/Plan  Parkinson's doing well  Continue Sinemet at the current dose and interval    Follow-up in 6 months    Christina Naidu MD          This note was created using voice recognition software. Despite editing, there may be syntax errors.

## 2021-08-09 ENCOUNTER — OFFICE VISIT (OUTPATIENT)
Dept: NEUROLOGY | Age: 86
End: 2021-08-09
Payer: MEDICARE

## 2021-08-09 VITALS
OXYGEN SATURATION: 97 % | BODY MASS INDEX: 24.75 KG/M2 | HEIGHT: 64 IN | SYSTOLIC BLOOD PRESSURE: 123 MMHG | HEART RATE: 69 BPM | RESPIRATION RATE: 16 BRPM | WEIGHT: 145 LBS | DIASTOLIC BLOOD PRESSURE: 53 MMHG

## 2021-08-09 DIAGNOSIS — G20 PARKINSONISM, UNSPECIFIED PARKINSONISM TYPE (HCC): Primary | ICD-10-CM

## 2021-08-09 PROCEDURE — G8427 DOCREV CUR MEDS BY ELIG CLIN: HCPCS | Performed by: PSYCHIATRY & NEUROLOGY

## 2021-08-09 PROCEDURE — G8420 CALC BMI NORM PARAMETERS: HCPCS | Performed by: PSYCHIATRY & NEUROLOGY

## 2021-08-09 PROCEDURE — G8432 DEP SCR NOT DOC, RNG: HCPCS | Performed by: PSYCHIATRY & NEUROLOGY

## 2021-08-09 PROCEDURE — 1090F PRES/ABSN URINE INCON ASSESS: CPT | Performed by: PSYCHIATRY & NEUROLOGY

## 2021-08-09 PROCEDURE — G8536 NO DOC ELDER MAL SCRN: HCPCS | Performed by: PSYCHIATRY & NEUROLOGY

## 2021-08-09 PROCEDURE — 1101F PT FALLS ASSESS-DOCD LE1/YR: CPT | Performed by: PSYCHIATRY & NEUROLOGY

## 2021-08-09 PROCEDURE — 99213 OFFICE O/P EST LOW 20 MIN: CPT | Performed by: PSYCHIATRY & NEUROLOGY

## 2021-08-09 RX ORDER — CARBIDOPA AND LEVODOPA 25; 100 MG/1; MG/1
TABLET ORAL
Qty: 405 TABLET | Refills: 3 | Status: SHIPPED | OUTPATIENT
Start: 2021-08-09 | End: 2022-10-27

## 2021-08-09 NOTE — PROGRESS NOTES
[facility] Neurology Clinics and  [address] at [facility] Neurology Clinics at [address] [facility], [address] [facility] [address] [facility], [address] [place] [address]   [phone]              Chief Complaint   Patient presents with    Parkinsons Disease     Current Outpatient Medications   Medication Sig Dispense Refill    carbidopa-levodopa (SINEMET)  mg per tablet TAKE ONE AND ONE-HALF TABLETS AT 8 A. M., 12 NOON, AND 4 P.M. 405 Tab 3    simvastatin (ZOCOR) 40 mg tablet TAKE 1 TABLET AT BEDTIME 90 Tab 3    esomeprazole (NEXIUM) 40 mg capsule TAKE 1 CAPSULE DAILY 90 Cap 3    sennosides (LAXATIVE PO) Take  by mouth.  cholecalciferol (Vitamin D3) 25 mcg (1,000 unit) cap Take  by mouth daily.  Biotin 2,500 mcg cap Take  by mouth.  VENTOLIN HFA 90 mcg/actuation inhaler INHALE 1-2 PUFFS BY MOUTH EVERY 4-6 HOURS AS NEEDED FOR SHORTNESS OF BREATH,COUGH,WHEEZING 18 Inhaler 2    aspirin 81 mg tablet Take 81 mg by mouth. Allergies   Allergen Reactions    Sulfa (Sulfonamide Antibiotics) Itching     Social History     Tobacco Use    Smoking status: Former Smoker     Packs/day: 2.00     Years: 30.00     Pack years: 60.00     Types: Cigarettes     Quit date: 1983     Years since quittin.3    Smokeless tobacco: Never Used   Substance Use Topics    Alcohol use: Yes    Drug use: Never     5year-old lady who returns today for follow-up Parkinson's. Last visit with me was in February and she was doing well on her regimen of Sinemet 1.5 tablets 8 AM 12 noon and 4 PM.  Today she reports overall she continues to do well. Tolerates medicine without any difficulty.   She still has some tremor particularly if she gets anxious or upset and that is annoying and bothersome    Examination  Visit Vitals  BP (!) 123/53 (BP 1 Location: Left upper arm, BP Patient Position: Sitting, BP Cuff Size: Adult)   Pulse 69   Resp 16   Ht 5' 4\" (1.626 m)   Wt 65.8 kg (145 lb)   SpO2 97%   BMI 24.89 kg/m²   Very pleasant lady. Awake alert oriented engaging and interactive. No icterus. Minimal rest tremor left hand. No cogwheeling. Good hand opening and closing. Good finger tapping. Good hand tapping. No bradykinesia. No ataxia    Impression/Plan  Parkinson's disease  Continue same dose of Sinemet  Follow-up in 6 months    Ana Lee MD        This note was created using voice recognition software. Despite editing, there may be syntax errors.

## 2021-08-25 ENCOUNTER — VIRTUAL VISIT (OUTPATIENT)
Dept: FAMILY MEDICINE CLINIC | Age: 86
End: 2021-08-25
Payer: MEDICARE

## 2021-08-25 DIAGNOSIS — J01.10 ACUTE NON-RECURRENT FRONTAL SINUSITIS: Primary | ICD-10-CM

## 2021-08-25 PROCEDURE — 1101F PT FALLS ASSESS-DOCD LE1/YR: CPT | Performed by: NURSE PRACTITIONER

## 2021-08-25 PROCEDURE — 99442 PR PHYS/QHP TELEPHONE EVALUATION 11-20 MIN: CPT | Performed by: NURSE PRACTITIONER

## 2021-08-25 PROCEDURE — G8427 DOCREV CUR MEDS BY ELIG CLIN: HCPCS | Performed by: NURSE PRACTITIONER

## 2021-08-25 PROCEDURE — 1090F PRES/ABSN URINE INCON ASSESS: CPT | Performed by: NURSE PRACTITIONER

## 2021-08-25 PROCEDURE — G8432 DEP SCR NOT DOC, RNG: HCPCS | Performed by: NURSE PRACTITIONER

## 2021-08-25 RX ORDER — AMOXICILLIN AND CLAVULANATE POTASSIUM 875; 125 MG/1; MG/1
1 TABLET, FILM COATED ORAL EVERY 12 HOURS
Qty: 20 TABLET | Refills: 0 | Status: SHIPPED | OUTPATIENT
Start: 2021-08-25 | End: 2021-09-04

## 2021-08-25 NOTE — PROGRESS NOTES
Kristan Bell is a 80 y.o. female, evaluated via audio-only technology on 8/25/2021 for Cough and Cold Symptoms  . Assessment & Plan:   Diagnoses and all orders for this visit:    1. Acute non-recurrent frontal sinusitis  -     amoxicillin-clavulanate (AUGMENTIN) 875-125 mg per tablet; Take 1 Tablet by mouth every twelve (12) hours for 10 days. Indications: acute bacterial infection of the sinuses    Patient was seen by telephone encounter. Patient is reporting symptoms since Sunday of cough, sinus congestion, mucus production and postnasal drip. Patient denies any fever. Patient had previously been immunized for COVID-19 back in January. Patient has maintained a good diet with fluid consumption. I recommended Mucinex as well as Flonase. Due to her sinus pressure and pain I have prescribed an antibiotic. I have asked her to take this with food and a probiotic to lessen GI side effect. I have asked her to follow-up if symptoms worsen. I also recommended that she have a COVID-19 test done today when she goes to  her prescription. 12  Subjective:       Prior to Admission medications    Medication Sig Start Date End Date Taking? Authorizing Provider   amoxicillin-clavulanate (AUGMENTIN) 875-125 mg per tablet Take 1 Tablet by mouth every twelve (12) hours for 10 days. Indications: acute bacterial infection of the sinuses 8/25/21 9/4/21 Yes Madie Liu NP   carbidopa-levodopa (SINEMET)  mg per tablet TAKE ONE AND ONE-HALF TABLETS AT 8 A. M., 12 NOON, AND 4 P.M. 8/9/21   Mary Buckley MD   simvastatin (ZOCOR) 40 mg tablet TAKE 1 TABLET AT BEDTIME 11/23/20   Carlos Escobar MD   esomeprazole (NEXIUM) 40 mg capsule TAKE 1 CAPSULE DAILY 11/2/20   Carlos Escobar MD   sennosides (LAXATIVE PO) Take  by mouth. Provider, Historical   cholecalciferol (Vitamin D3) 25 mcg (1,000 unit) cap Take  by mouth daily. Provider, Historical   Biotin 2,500 mcg cap Take  by mouth.     Provider, Historical   VENTOLIN HFA 90 mcg/actuation inhaler INHALE 1-2 PUFFS BY MOUTH EVERY 4-6 HOURS AS NEEDED FOR SHORTNESS OF BREATH,COUGH,WHEEZING 5/13/19   Lawrence Combs MD   aspirin 81 mg tablet Take 81 mg by mouth. Provider, Historical     Patient Active Problem List   Diagnosis Code    GERD (gastroesophageal reflux disease) K21.9    Hypercholesteremia E78.00    Osteoarthritis of knee M17.10    Paronychia of finger L03.019    Dyspnea R06.00    Other emphysema ([facility] [address] J43.8    Parkinson's disease ([facility] G20    Bilateral carotid artery stenosis I65.23     Patient Active Problem List    Diagnosis Date Noted    Parkinson's disease ([facility] [address] 09/21/2020    Bilateral carotid artery stenosis 09/21/2020    Other emphysema ([facility] [address] 10/01/2018    GERD (gastroesophageal reflux disease) 09/27/2018    Hypercholesteremia 09/27/2018    Osteoarthritis of knee 09/27/2018    Paronychia of finger 09/27/2018    Dyspnea 09/27/2018     Current Outpatient Medications   Medication Sig Dispense Refill    amoxicillin-clavulanate (AUGMENTIN) 875-125 mg per tablet Take 1 Tablet by mouth every twelve (12) hours for 10 days. Indications: acute bacterial infection of the sinuses 20 Tablet 0    carbidopa-levodopa (SINEMET)  mg per tablet TAKE ONE AND ONE-HALF TABLETS AT 8 A. M., 12 NOON, AND 4 P.M. 405 Tablet 3    simvastatin (ZOCOR) 40 mg tablet TAKE 1 TABLET AT BEDTIME 90 Tab 3    esomeprazole (NEXIUM) 40 mg capsule TAKE 1 CAPSULE DAILY 90 Cap 3    sennosides (LAXATIVE PO) Take  by mouth.  cholecalciferol (Vitamin D3) 25 mcg (1,000 unit) cap Take  by mouth daily.  Biotin 2,500 mcg cap Take  by mouth.  VENTOLIN HFA 90 mcg/actuation inhaler INHALE 1-2 PUFFS BY MOUTH EVERY 4-6 HOURS AS NEEDED FOR SHORTNESS OF BREATH,COUGH,WHEEZING 18 Inhaler 2    aspirin 81 mg tablet Take 81 mg by mouth.          Allergies   Allergen Reactions    Sulfa (Sulfonamide Antibiotics) Itching     Past Medical History:   Diagnosis Date    Benign essential tremor     Carotid artery stenosis     Medical management, 10-49% blockage.  GERD (gastroesophageal reflux disease)     Hyperlipidemia     Osteoarthritis     Parkinson's disease ([facility] [address]        Review of Systems   Constitutional: Negative for fever and malaise/fatigue. HENT: Positive for congestion and sinus pain. Negative for sore throat. Eyes: Negative. Respiratory: Positive for cough. Negative for shortness of breath and wheezing. Cardiovascular: Negative for chest pain. Gastrointestinal: Negative for diarrhea, nausea and vomiting. Genitourinary: Negative for dysuria. Musculoskeletal: Negative. Neurological: Negative for dizziness and headaches. Endo/Heme/Allergies: Negative for environmental allergies. Psychiatric/Behavioral: Negative. No flowsheet data found. Shira Maya, who was evaluated through a patient-initiated, synchronous (real-time) audio only encounter, and/or her healthcare decision maker, is aware that it is a billable service, with coverage as determined by her insurance carrier. She provided verbal consent to proceed: Yes. She has not had a related appointment within my department in the past 7 days or scheduled within the next 24 hours. On this date 08/25/2021 I have spent 20 minutes reviewing previous notes, test results and face to face (virtual) with the patient discussing the diagnosis and importance of compliance with the treatment plan as well as documenting on the day of the visit.     Jessica García NP

## 2021-08-27 LAB — SARS-COV-2, NAA: NOT DETECTED

## 2022-01-13 ENCOUNTER — OFFICE VISIT (OUTPATIENT)
Dept: FAMILY MEDICINE CLINIC | Age: 87
End: 2022-01-13
Payer: MEDICARE

## 2022-01-13 VITALS
WEIGHT: 148.8 LBS | HEIGHT: 64 IN | RESPIRATION RATE: 16 BRPM | OXYGEN SATURATION: 96 % | BODY MASS INDEX: 25.4 KG/M2 | DIASTOLIC BLOOD PRESSURE: 82 MMHG | SYSTOLIC BLOOD PRESSURE: 143 MMHG | TEMPERATURE: 97.6 F | HEART RATE: 70 BPM

## 2022-01-13 DIAGNOSIS — J43.8 OTHER EMPHYSEMA (HCC): ICD-10-CM

## 2022-01-13 DIAGNOSIS — G20 PARKINSONISM, UNSPECIFIED PARKINSONISM TYPE (HCC): ICD-10-CM

## 2022-01-13 DIAGNOSIS — Z00.00 MEDICARE ANNUAL WELLNESS VISIT, SUBSEQUENT: Primary | ICD-10-CM

## 2022-01-13 DIAGNOSIS — I73.9 CLAUDICATION (HCC): ICD-10-CM

## 2022-01-13 DIAGNOSIS — K21.9 GASTROESOPHAGEAL REFLUX DISEASE WITHOUT ESOPHAGITIS: ICD-10-CM

## 2022-01-13 DIAGNOSIS — H60.60 CHRONIC OTITIS EXTERNA, UNSPECIFIED LATERALITY, UNSPECIFIED TYPE: ICD-10-CM

## 2022-01-13 DIAGNOSIS — E78.00 HYPERCHOLESTEREMIA: ICD-10-CM

## 2022-01-13 PROCEDURE — G8419 CALC BMI OUT NRM PARAM NOF/U: HCPCS | Performed by: FAMILY MEDICINE

## 2022-01-13 PROCEDURE — 1101F PT FALLS ASSESS-DOCD LE1/YR: CPT | Performed by: FAMILY MEDICINE

## 2022-01-13 PROCEDURE — 99213 OFFICE O/P EST LOW 20 MIN: CPT | Performed by: FAMILY MEDICINE

## 2022-01-13 PROCEDURE — G8510 SCR DEP NEG, NO PLAN REQD: HCPCS | Performed by: FAMILY MEDICINE

## 2022-01-13 PROCEDURE — 1090F PRES/ABSN URINE INCON ASSESS: CPT | Performed by: FAMILY MEDICINE

## 2022-01-13 PROCEDURE — G0439 PPPS, SUBSEQ VISIT: HCPCS | Performed by: FAMILY MEDICINE

## 2022-01-13 PROCEDURE — G8427 DOCREV CUR MEDS BY ELIG CLIN: HCPCS | Performed by: FAMILY MEDICINE

## 2022-01-13 PROCEDURE — G8536 NO DOC ELDER MAL SCRN: HCPCS | Performed by: FAMILY MEDICINE

## 2022-01-13 RX ORDER — NEOMYCIN SULFATE, POLYMYXIN B SULFATE AND HYDROCORTISONE 10; 3.5; 1 MG/ML; MG/ML; [USP'U]/ML
3 SUSPENSION/ DROPS AURICULAR (OTIC) 4 TIMES DAILY
Qty: 10 ML | Refills: 2 | Status: SHIPPED | OUTPATIENT
Start: 2022-01-13

## 2022-01-13 RX ORDER — ESOMEPRAZOLE MAGNESIUM 40 MG/1
CAPSULE, DELAYED RELEASE ORAL
Qty: 90 CAPSULE | Refills: 3 | Status: SHIPPED | OUTPATIENT
Start: 2022-01-13

## 2022-01-13 RX ORDER — SIMVASTATIN 40 MG/1
40 TABLET, FILM COATED ORAL
Qty: 90 TABLET | Refills: 3 | Status: SHIPPED | OUTPATIENT
Start: 2022-01-13

## 2022-01-13 NOTE — PROGRESS NOTES
This is the Subsequent Medicare Annual Wellness Exam, performed 12 months or more after the Initial AWV or the last Subsequent AWV    I have reviewed the patient's medical history in detail and updated the computerized patient record. Assessment/Plan   Education and counseling provided:  Are appropriate based on today's review and evaluation  End-of-Life planning (with patient's consent)    1. Medicare annual wellness visit, subsequent  -     diph,pertuss,acel,,tetanus vac,PF, (ADACEL) 2 Lf-(2.5-5-3-5 mcg)-5Lf/0.5 mL syrg vaccine; 0.5 mL by IntraMUSCular route once for 1 dose., Print, Disp-0.5 mL, R-0  2. Hypercholesteremia  -     simvastatin (ZOCOR) 40 mg tablet; Take 1 Tablet by mouth nightly., Normal, Disp-90 Tablet, R-3  3. Gastroesophageal reflux disease without esophagitis  -     esomeprazole (NEXIUM) 40 mg capsule; TAKE 1 CAPSULE DAILY, Normal, Disp-90 Capsule, R-3  4. Parkinsonism, unspecified Parkinsonism type ([address]  5. Other emphysema ([address]  6. Claudication ([address]  7. Chronic otitis externa, unspecified laterality, unspecified type  -     neomycin-polymyxin-hydrocortisone, buffered, (PEDIOTIC) 3.5-10,000-1 mg/mL-unit/mL-% otic suspension; Administer 3 Drops in left ear four (4) times daily. , Normal, Disp-10 mL, R-2       Depression Risk Factor Screening     3 most recent PHQ Screens 1/13/2022   Little interest or pleasure in doing things Not at all   Feeling down, depressed, irritable, or hopeless Not at all   Total Score PHQ 2 0       Alcohol & Drug Abuse Risk Screen    Do you average more than 1 drink per night or more than 7 drinks a week:  No    On any one occasion in the past three months have you have had more than 3 drinks containing alcohol:  No    Non smoker        Functional Ability and Level of Safety    Hearing: Hearing is good. Vision:  Needs exam has scheduled. Activities of Daily Living:   The home contains: handrails  Patient does total self care      Ambulation: with no difficulty   ASPVD left leg. Fall Risk:  Fall Risk Assessment, last 12 mths 1/13/2022   Able to walk? Yes   Fall in past 12 months? 0   Do you feel unsteady? 0   Are you worried about falling 0      Abuse Screen:  Patient is not abused       Cognitive Screening    Has your family/caregiver stated any concerns about your memory: no     Cognitive Screening: Normal - interview    Health Maintenance Due     Health Maintenance Due   Topic Date Due    DTaP/Tdap/Td series (1 - Tdap) 05/11/2006    Lipid Screen  09/21/2021       Patient Care Team   Patient Care Team:  Regis Kehr, MD as PCP - General (Family Medicine)  Regis Kehr, MD as PCP [facility]  Angie Johnson MD (Neurology)    History     Patient Active Problem List   Diagnosis Code    GERD (gastroesophageal reflux disease) K21.9    Hypercholesteremia E78.00    Osteoarthritis of knee M17.10    Paronychia of finger L03.019    Dyspnea R06.00    Other emphysema ([address] J43.8    Parkinson's disease ([address] G20    Bilateral carotid artery stenosis I65.23     Past Medical History:   Diagnosis Date    Benign essential tremor     Carotid artery stenosis     Medical management, 10-49% blockage.  GERD (gastroesophageal reflux disease)     Hyperlipidemia     Osteoarthritis     Parkinson's disease ([address]       Past Surgical History:   Procedure Laterality Date    HX DILATION AND CURETTAGE  1984    x2    HX TONSILLECTOMY  1957     Current Outpatient Medications   Medication Sig Dispense Refill    esomeprazole (NEXIUM) 40 mg capsule TAKE 1 CAPSULE DAILY 90 Capsule 3    simvastatin (ZOCOR) 40 mg tablet Take 1 Tablet by mouth nightly. 90 Tablet 3    neomycin-polymyxin-hydrocortisone, buffered, (PEDIOTIC) 3.5-10,000-1 mg/mL-unit/mL-% otic suspension Administer 3 Drops in left ear four (4) times daily.  10 mL 2    diph,pertuss,acel,,tetanus vac,PF, (ADACEL) 2 Lf-(2.5-5-3-5 mcg)-5Lf/0.5 mL syrg vaccine 0.5 mL by IntraMUSCular route once for 1 dose. 0.5 mL 0    carbidopa-levodopa (SINEMET)  mg per tablet TAKE ONE AND ONE-HALF TABLETS AT 8 A. M., 12 NOON, AND 4 P.M. 405 Tablet 3    sennosides (LAXATIVE PO) Take  by mouth.  cholecalciferol (Vitamin D3) 25 mcg (1,000 unit) cap Take  by mouth daily.  Biotin 2,500 mcg cap Take  by mouth.  VENTOLIN HFA 90 mcg/actuation inhaler INHALE 1-2 PUFFS BY MOUTH EVERY 4-6 HOURS AS NEEDED FOR SHORTNESS OF BREATH,COUGH,WHEEZING 18 Inhaler 2    aspirin 81 mg tablet Take 81 mg by mouth. Allergies   Allergen Reactions    Sulfa (Sulfonamide Antibiotics) Itching       Family History   Problem Relation Age of Onset    Cancer Brother 70        brain cancer    Cancer Brother 79        brain cancer    Cancer Brother 54        colon cancer     Social History     Tobacco Use    Smoking status: Former Smoker     Packs/day: 2.00     Years: 30.00     Pack years: 60.00     Types: Cigarettes     Quit date: 1983     Years since quittin.7    Smokeless tobacco: Never Used   Substance Use Topics    Alcohol use: Yes         MD Emeli Chapin  80 y.o. female  1931  FD  [facility]  Progress Note     Encounter Date: 2022    Assessment and Plan:     Encounter Diagnoses     ICD-10-CM ICD-9-CM   1. Medicare annual wellness visit, subsequent  Z00.00 V70.0   2. Hypercholesteremia  E78.00 272.0   3. Gastroesophageal reflux disease without esophagitis  K21.9 530.81   4. Parkinsonism, unspecified [facility]ism type ([facility]  G20 332.0   5. Other emphysema ([facility]  J43.8 492.8   6. Claudication (HCC)  I73.9 443.9   7. Chronic otitis externa, unspecified laterality, unspecified type  H60.60 380.23       1. Medicare annual wellness visit, subsequent  updated  - diph,pertuss,acel,,tetanus vac,PF, (ADACEL) 2 Lf-(2.5-5-3-5 mcg)-5Lf/0.5 mL syrg vaccine; 0.5 mL by IntraMUSCular route once for 1 dose. Dispense: 0.5 mL; Refill: 0    2. Hypercholesteremia  Previous lipids at goal.  BP acceptable at her age. - simvastatin (ZOCOR) 40 mg tablet; Take 1 Tablet by mouth nightly. Dispense: 90 Tablet; Refill: 3    3. Gastroesophageal reflux disease without esophagitis  Doing well, refilled  - esomeprazole (NEXIUM) 40 mg capsule; TAKE 1 CAPSULE DAILY  Dispense: 90 Capsule; Refill: 3    4. Parkinsonism, unspecified Parkinsonism type ([address]  Still under rx with Dr. Carolina Balderas    5. Other emphysema ([address]  Stable on no rx    6. Claudication ([address]  Left leg claudication with walking  Continue gradual increase in walking    7. Chronic otitis externa, unspecified laterality, unspecified type  refilled  - neomycin-polymyxin-hydrocortisone, buffered, (PEDIOTIC) 3.5-10,000-1 mg/mL-unit/mL-% otic suspension; Administer 3 Drops in left ear four (4) times daily. Dispense: 10 mL; Refill: 2      I have discussed the diagnosis with the patient and the intended plan as seen in the above orders. she has expressed understanding. The patient has received an after-visit summary and questions were answered concerning future plans. I have discussed medication side effects and warnings with the patient as well. Electronically Signed: Martha Agrawal MD    Current Medications after this visit     Current Outpatient Medications   Medication Sig    esomeprazole (NEXIUM) 40 mg capsule TAKE 1 CAPSULE DAILY    simvastatin (ZOCOR) 40 mg tablet Take 1 Tablet by mouth nightly.  neomycin-polymyxin-hydrocortisone, buffered, (PEDIOTIC) 3.5-10,000-1 mg/mL-unit/mL-% otic suspension Administer 3 Drops in left ear four (4) times daily.  diph,pertuss,acel,,tetanus vac,PF, (ADACEL) 2 Lf-(2.5-5-3-5 mcg)-5Lf/0.5 mL syrg vaccine 0.5 mL by IntraMUSCular route once for 1 dose.  carbidopa-levodopa (SINEMET)  mg per tablet TAKE ONE AND ONE-HALF TABLETS AT 8 A. M., 12 NOON, AND 4 P. M.    sennosides (LAXATIVE PO) Take  by mouth.     cholecalciferol (Vitamin D3) 25 mcg (1,000 unit) cap Take  by mouth daily.  Biotin 2,500 mcg cap Take  by mouth.  VENTOLIN HFA 90 mcg/actuation inhaler INHALE 1-2 PUFFS BY MOUTH EVERY 4-6 HOURS AS NEEDED FOR SHORTNESS OF BREATH,COUGH,WHEEZING    aspirin 81 mg tablet Take 81 mg by mouth. No current facility-administered medications for this visit. Medications Discontinued During This Encounter   Medication Reason    esomeprazole (NEXIUM) 40 mg capsule REORDER    simvastatin (ZOCOR) 40 mg tablet REORDER     ~~~~~~~~~~~~~~~~~~~~~~~~~~~~~~~~~~~~~~~~~~~~~~~~~~~~~~~~~~~    Chief Complaint   Patient presents with    Medication Refill     Nexium / Simvastatin        History provided by patient  History of Present Illness   Fidelia Miller is a 80 y.o. female who presents to clinic today for:  Medication Refill (Nexium / Simvastatin )    Hypercholesterolemia  Remains on statin and asa    GERD  Needs refill  SX well controlled    Otitis externa  Periodically needs cortisporin  Needs refill    Still sees Dr. Zuri Hendrickson for Parkinsonism  Doing well with Sinemet  No falls    Health Maintenance  Completed HM gaps at today's visit  Health Maintenance Due   Topic Date Due    DTaP/Tdap/Td series (1 - Tdap) 05/11/2006    Lipid Screen  09/21/2021     Review of Systems   Review of Systems   Respiratory: Negative for shortness of breath. Cardiovascular: Negative for chest pain. Gastrointestinal: Negative for blood in stool. Genitourinary: Negative for hematuria. Psychiatric/Behavioral: Negative. Vitals/Objective:     Vitals:    01/13/22 1024   BP: (!) 143/82   Pulse: 70   Resp: 16   Temp: 97.6 °F (36.4 °C)   TempSrc: Temporal   SpO2: 96%   Weight: 148 lb 12.8 oz (67.5 kg)   Height: 5' 4\" (1.626 m)     Body mass index is 25.54 kg/m². Wt Readings from Last 3 Encounters:   01/13/22 148 lb 12.8 oz (67.5 kg)   08/09/21 145 lb (65.8 kg)   02/08/21 145 lb (65.8 kg)         Objective  Physical Exam  Vitals and nursing note reviewed.    Constitutional: Appearance: Normal appearance. She is not toxic-appearing. HENT:      Head: Normocephalic and atraumatic. Cardiovascular:      Rate and Rhythm: Normal rate and regular rhythm. Heart sounds: Normal heart sounds. No murmur heard. No gallop. Pulmonary:      Effort: Pulmonary effort is normal. No respiratory distress. Breath sounds: Normal breath sounds. No wheezing, rhonchi or rales. Musculoskeletal:      Cervical back: No muscular tenderness. Lymphadenopathy:      Cervical: No cervical adenopathy. Neurological:      Mental Status: She is alert. Psychiatric:         Mood and Affect: Mood normal.         Behavior: Behavior normal.         Thought Content: Thought content normal.         Judgment: Judgment normal.         No results found for this or any previous visit (from the past 24 hour(s)). Disposition     Follow-up and Dispositions  ·   Return in about 1 year (around 2023) for Annual exam.       Future Appointments   Date Time Provider [place]   2022 10:30 AM Carmelina Buckley MD [facility]       History   Patient's past medical, surgical and family histories were reviewed and updated. Past Medical History:   Diagnosis Date    Benign essential tremor     Carotid artery stenosis     Medical management, 10-49% blockage.     GERD (gastroesophageal reflux disease)     Hyperlipidemia     Osteoarthritis     Parkinson's disease ([facility] [address]      Past Surgical History:   Procedure Laterality Date    HX DILATION AND CURETTAGE  1984    x2    HX TONSILLECTOMY       Family History   Problem Relation Age of Onset    Cancer Brother 70        brain cancer    Cancer Brother 79        brain cancer    Cancer Brother 54        colon cancer     Social History     Tobacco Use    Smoking status: Former Smoker     Packs/day: 2.00     Years: 30.00     Pack years: 60.00     Types: Cigarettes     Quit date: 1983     Years since quittin.7    Smokeless tobacco: Never Used   Substance Use Topics    Alcohol use:  Yes    Drug use: Never       Allergies     Allergies   Allergen Reactions    Sulfa (Sulfonamide Antibiotics) Itching

## 2022-01-13 NOTE — PATIENT INSTRUCTIONS
Medicare Wellness Visit, Female     The best way to live healthy is to have a lifestyle where you eat a well-balanced diet, exercise regularly, limit alcohol use, and quit all forms of tobacco/nicotine, if applicable. Regular preventive services are another way to keep healthy. Preventive services (vaccines, screening tests, monitoring & exams) can help personalize your care plan, which helps you manage your own care. Screening tests can find health problems at the earliest stages, when they are easiest to treat. [facility] follows the current, evidence-based guidelines published by the [facility] ([facility]) when recommending preventive services for our patients. Because we follow these guidelines, sometimes recommendations change over time as research supports it. (For example, mammograms used to be recommended annually. Even though Medicare will still pay for an annual mammogram, the newer guidelines recommend a mammogram every two years for women of average risk). Of course, you and your doctor may decide to screen more often for some diseases, based on your risk and your co-morbidities (chronic disease you are already diagnosed with). Preventive services for you include:  - Medicare offers their members a free annual wellness visit, which is time for you and your primary care provider to discuss and plan for your preventive service needs. Take advantage of this benefit every year!  -All adults over the age of 72 should receive the recommended pneumonia vaccines. Current USPSTF guidelines recommend a series of two vaccines for the best pneumonia protection.   -All adults should have a flu vaccine yearly and a tetanus vaccine every 10 years.   -All adults age 48 and older should receive the shingles vaccines (series of two vaccines).       -All adults age 38-68 who are overweight should have a diabetes screening test once every three years.   -All adults born between 80 and 1965 should be screened once for Hepatitis C.  -Other screening tests and preventive services for persons with diabetes include: an eye exam to screen for diabetic retinopathy, a kidney function test, a foot exam, and stricter control over your cholesterol.   -Cardiovascular screening for adults with routine risk involves an electrocardiogram (ECG) at intervals determined by your doctor.   -Colorectal cancer screenings should be done for adults age 54-65 with no increased risk factors for colorectal cancer. There are a number of acceptable methods of screening for this type of cancer. Each test has its own benefits and drawbacks. Discuss with your doctor what is most appropriate for you during your annual wellness visit. The different tests include: colonoscopy (considered the best screening method), a fecal occult blood test, a fecal DNA test, and sigmoidoscopy.    -A bone mass density test is recommended when a woman turns 65 to screen for osteoporosis. This test is only recommended one time, as a screening. Some providers will use this same test as a disease monitoring tool if you already have osteoporosis. -Breast cancer screenings are recommended every other year for women of normal risk, age 54-69.  -Cervical cancer screenings for women over age 72 are only recommended with certain risk factors. Here is a list of your current Health Maintenance items (your personalized list of preventive services) with a due date:  Health Maintenance Due   Topic Date Due    Bone Mineral Density   Never done    DTaP/Tdap/Td  (1 - Tdap) 05/11/2006    Yearly Flu Vaccine (1) 09/01/2021    Cholesterol Test   09/21/2021         Medicare Wellness Visit, Female     The best way to live healthy is to have a lifestyle where you eat a well-balanced diet, exercise regularly, limit alcohol use, and quit all forms of tobacco/nicotine, if applicable.      Regular preventive services are another way to keep healthy. Preventive services (vaccines, screening tests, monitoring & exams) can help personalize your care plan, which helps you manage your own care. Screening tests can find health problems at the earliest stages, when they are easiest to treat. [facility] follows the current, evidence-based guidelines published by the [facility] ([facility]) when recommending preventive services for our patients. Because we follow these guidelines, sometimes recommendations change over time as research supports it. (For example, mammograms used to be recommended annually. Even though Medicare will still pay for an annual mammogram, the newer guidelines recommend a mammogram every two years for women of average risk). Of course, you and your doctor may decide to screen more often for some diseases, based on your risk and your co-morbidities (chronic disease you are already diagnosed with). Preventive services for you include:  - Medicare offers their members a free annual wellness visit, which is time for you and your primary care provider to discuss and plan for your preventive service needs. Take advantage of this benefit every year!  -All adults over the age of 72 should receive the recommended pneumonia vaccines. Current USPSTF guidelines recommend a series of two vaccines for the best pneumonia protection.   -All adults should have a flu vaccine yearly and a tetanus vaccine every 10 years.   -All adults age 48 and older should receive the shingles vaccines (series of two vaccines).       -All adults age 38-68 who are overweight should have a diabetes screening test once every three years.   -All adults born between 80 and 1965 should be screened once for Hepatitis C.  -Other screening tests and preventive services for persons with diabetes include: an eye exam to screen for diabetic retinopathy, a kidney function test, a foot exam, and stricter control over your cholesterol.   -Cardiovascular screening for adults with routine risk involves an electrocardiogram (ECG) at intervals determined by your doctor.   -Colorectal cancer screenings should be done for adults age 54-65 with no increased risk factors for colorectal cancer. There are a number of acceptable methods of screening for this type of cancer. Each test has its own benefits and drawbacks. Discuss with your doctor what is most appropriate for you during your annual wellness visit. The different tests include: colonoscopy (considered the best screening method), a fecal occult blood test, a fecal DNA test, and sigmoidoscopy.    -A bone mass density test is recommended when a woman turns 65 to screen for osteoporosis. This test is only recommended one time, as a screening. Some providers will use this same test as a disease monitoring tool if you already have osteoporosis. -Breast cancer screenings are recommended every other year for women of normal risk, age 54-69.  -Cervical cancer screenings for women over age 72 are only recommended with certain risk factors.      Here is a list of your current Health Maintenance items (your personalized list of preventive services) with a due date:  Health Maintenance Due   Topic Date Due    Bone Mineral Density   Never done    DTaP/Tdap/Td  (1 - Tdap) 05/11/2006    Cholesterol Test   09/21/2021

## 2022-01-13 NOTE — PROGRESS NOTES
Ismael Chavez is a 80 y.o. female      Chief Complaint   Patient presents with    Medication Refill     Nexium / Simvastatin          1. Have you been to the ER, urgent care clinic since your last visit? No  Hospitalized since your last visit? No       2. Have you seen or consulted any other health care providers outside of the [address] [place] [address] since your last visit? Include any pap smears or colon screening.    No

## 2022-03-18 PROBLEM — K21.9 GERD (GASTROESOPHAGEAL REFLUX DISEASE): Status: ACTIVE | Noted: 2018-09-27

## 2022-03-18 PROBLEM — M17.9 OSTEOARTHRITIS OF KNEE: Status: ACTIVE | Noted: 2018-09-27

## 2022-03-19 PROBLEM — R06.00 DYSPNEA: Status: ACTIVE | Noted: 2018-09-27

## 2022-03-19 PROBLEM — E78.00 HYPERCHOLESTEREMIA: Status: ACTIVE | Noted: 2018-09-27

## 2022-03-19 PROBLEM — I65.23 BILATERAL CAROTID ARTERY STENOSIS: Status: ACTIVE | Noted: 2020-09-21

## 2022-03-19 PROBLEM — L03.019 PARONYCHIA OF FINGER: Status: ACTIVE | Noted: 2018-09-27

## 2022-03-19 PROBLEM — J43.8 OTHER EMPHYSEMA (HCC): Status: ACTIVE | Noted: 2018-10-01

## 2022-03-20 PROBLEM — G20.A1 PARKINSON'S DISEASE: Status: ACTIVE | Noted: 2020-09-21

## 2022-03-20 PROBLEM — G20 PARKINSON'S DISEASE (HCC): Status: ACTIVE | Noted: 2020-09-21

## 2022-10-27 RX ORDER — CARBIDOPA AND LEVODOPA 25; 100 MG/1; MG/1
TABLET ORAL
Qty: 405 TABLET | Refills: 3 | Status: SHIPPED | OUTPATIENT
Start: 2022-10-27

## 2023-02-10 DIAGNOSIS — K21.9 GASTROESOPHAGEAL REFLUX DISEASE WITHOUT ESOPHAGITIS: ICD-10-CM

## 2023-02-10 RX ORDER — ESOMEPRAZOLE MAGNESIUM 40 MG/1
CAPSULE, DELAYED RELEASE ORAL
Qty: 90 CAPSULE | Refills: 3 | Status: SHIPPED | OUTPATIENT
Start: 2023-02-10

## 2023-02-13 DIAGNOSIS — E78.00 HYPERCHOLESTEREMIA: ICD-10-CM

## 2023-02-13 RX ORDER — SIMVASTATIN 40 MG/1
40 TABLET, FILM COATED ORAL
Qty: 90 TABLET | Refills: 3 | Status: SHIPPED | OUTPATIENT
Start: 2023-02-13

## 2023-02-13 NOTE — TELEPHONE ENCOUNTER
PCP: Archana Esquivel MD    Last appt: 1/13/2022  No future appointments.     Requested Prescriptions      No prescriptions requested or ordered in this encounter       Prior labs and Blood pressures:  BP Readings from Last 3 Encounters:   01/13/22 (!) 143/82   08/09/21 (!) 123/53   02/08/21 (!) 142/68     Lab Results   Component Value Date/Time    Sodium 142 09/21/2020 09:56 AM    Potassium 4.2 09/23/2020 09:35 AM    Chloride 109 (H) 09/21/2020 09:56 AM    CO2 30 09/21/2020 09:56 AM    Anion gap 3 (L) 09/21/2020 09:56 AM    Glucose 92 09/21/2020 09:56 AM    BUN 14 09/21/2020 09:56 AM    Creatinine 0.99 09/21/2020 09:56 AM    BUN/Creatinine ratio 14 09/21/2020 09:56 AM    GFR est AA >60 09/21/2020 09:56 AM    GFR est non-AA 53 (L) 09/21/2020 09:56 AM    Calcium 9.3 09/21/2020 09:56 AM     Lab Results   Component Value Date/Time    Hemoglobin A1c 5.8 (H) 08/15/2019 11:59 AM     Lab Results   Component Value Date/Time    Cholesterol, total 135 09/21/2020 09:56 AM    HDL Cholesterol 79 09/21/2020 09:56 AM    LDL, calculated 35.2 09/21/2020 09:56 AM    VLDL, calculated 20.8 09/21/2020 09:56 AM    Triglyceride 104 09/21/2020 09:56 AM    CHOL/HDL Ratio 1.7 09/21/2020 09:56 AM     No results found for: Robbie Green, XQVID3, XQVID, VD3RIA    No results found for: TSH, TSH2, TSH3, TSHP, TSHEXT

## 2023-04-06 ENCOUNTER — OFFICE VISIT (OUTPATIENT)
Dept: NEUROLOGY | Age: 88
End: 2023-04-06
Payer: MEDICARE

## 2023-04-06 PROCEDURE — 1090F PRES/ABSN URINE INCON ASSESS: CPT | Performed by: PSYCHIATRY & NEUROLOGY

## 2023-04-06 PROCEDURE — 1123F ACP DISCUSS/DSCN MKR DOCD: CPT | Performed by: PSYCHIATRY & NEUROLOGY

## 2023-04-06 PROCEDURE — G8510 SCR DEP NEG, NO PLAN REQD: HCPCS | Performed by: PSYCHIATRY & NEUROLOGY

## 2023-04-06 PROCEDURE — G8427 DOCREV CUR MEDS BY ELIG CLIN: HCPCS | Performed by: PSYCHIATRY & NEUROLOGY

## 2023-04-06 PROCEDURE — G8536 NO DOC ELDER MAL SCRN: HCPCS | Performed by: PSYCHIATRY & NEUROLOGY

## 2023-04-06 PROCEDURE — 1101F PT FALLS ASSESS-DOCD LE1/YR: CPT | Performed by: PSYCHIATRY & NEUROLOGY

## 2023-04-06 PROCEDURE — 99214 OFFICE O/P EST MOD 30 MIN: CPT | Performed by: PSYCHIATRY & NEUROLOGY

## 2023-04-06 PROCEDURE — G8420 CALC BMI NORM PARAMETERS: HCPCS | Performed by: PSYCHIATRY & NEUROLOGY

## 2023-04-06 NOTE — PROGRESS NOTES
Chief Complaint   Patient presents with    Follow-up     Patient states that symptoms have gotten worst       Visit Vitals  BP (!) 146/63   Pulse 71   Temp 98 °F (36.7 °C)   Resp 16   Ht 5' 4\" (1.626 m)   Wt 143 lb 6.4 oz (65 kg)   SpO2 96%   BMI 24.61 kg/m²     1. Have you been to the ER, urgent care clinic since your last visit? Hospitalized since your last visit? No    2. Have you seen or consulted any other health care providers outside of the [address] [place] [address] since your last visit? Include any pap smears or colon screening.  No

## 2023-04-06 NOTE — PROGRESS NOTES
[facility] and  [address] at [facility] Neurology Clinics at [address] [facility], [address] [place] [address] [address] [facility], [address] [place] [address]   [phone]              Chief Complaint   Patient presents with    Follow-up     Patient states that symptoms have gotten worst     Current Outpatient Medications   Medication Sig Dispense Refill    simvastatin (ZOCOR) 40 mg tablet Take 1 Tablet by mouth nightly. 90 Tablet 3    esomeprazole (NEXIUM) 40 mg capsule TAKE 1 CAPSULE DAILY 90 Capsule 3    carbidopa-levodopa (SINEMET)  mg per tablet TAKE ONE AND ONE-HALF TABLETS AT 8 A. M., 12 NOON AND 4 P.M. 405 Tablet 3    neomycin-polymyxin-hydrocortisone, buffered, (PEDIOTIC) 3.5-10,000-1 mg/mL-unit/mL-% otic suspension Administer 3 Drops in left ear four (4) times daily. 10 mL 2    sennosides (LAXATIVE PO) Take  by mouth. cholecalciferol (VITAMIN D3) 25 mcg (1,000 unit) cap Take  by mouth daily. Biotin 2,500 mcg cap Take  by mouth. VENTOLIN HFA 90 mcg/actuation inhaler INHALE 1-2 PUFFS BY MOUTH EVERY 4-6 HOURS AS NEEDED FOR SHORTNESS OF BREATH,COUGH,WHEEZING 18 Inhaler 2    aspirin 81 mg tablet Take 1 Tablet by mouth. Allergies   Allergen Reactions    Sulfa (Sulfonamide Antibiotics) Itching     Social History     Tobacco Use    Smoking status: Former     Packs/day: 2.00     Years: 30.00     Pack years: 60.00     Types: Cigarettes     Quit date: 1983     Years since quittin.0    Smokeless tobacco: Never   Substance Use Topics    Alcohol use: Yes    Drug use: Never     80-year-old lady returns today for follow-up Parkinson's. Her last visit with me was in August and at that time we maintained her Sinemet at 1.5 tablets 3 times daily 8, 12 and 4 PM.  Today she reports some worsening of symptoms. At times she feels like her head is too heavy.   She also feels like she shuffles with her left foot a bit.  She also feels like her balance is not as good as it used to be and now she wants to hold onto the railing when going up stairs. Examination  Visit Vitals  BP (!) 146/63   Pulse 71   Temp 98 °F (36.7 °C)   Resp 16   Ht 5' 4\" (1.626 m)   Wt 65 kg (143 lb 6.4 oz)   SpO2 96%   BMI 24.61 kg/m²     Very pleasant lady. She is awake alert engaging and interactive. Her speech language and cognition are normal.  She has rest tremor of the left lower extremity. No tremor of the hands. No bradykinesia. No cogwheeling. When she arises out of the chair she has a slight hunch and she has a relatively good stride with a 2 step turn but every once in a while her left foot will have a bit of a shuffle or a stick    Impression/Plan  Parkinson's with some mild worsening  Increase Sinemet to a dose of 2 tablets 3 times daily 8 AM 12 noon and 4 PM  Follow-up in 6 months if this does well    Ellen Gimenez MD        This note was created using voice recognition software. Despite editing, there may be syntax errors.

## 2023-05-21 RX ORDER — ALBUTEROL SULFATE 90 UG/1
AEROSOL, METERED RESPIRATORY (INHALATION)
COMMUNITY
Start: 2019-05-13

## 2023-05-21 RX ORDER — SIMVASTATIN 40 MG
1 TABLET ORAL NIGHTLY
COMMUNITY
Start: 2023-02-13

## 2023-05-21 RX ORDER — ESOMEPRAZOLE MAGNESIUM 40 MG/1
1 CAPSULE, DELAYED RELEASE ORAL DAILY
COMMUNITY
Start: 2023-02-10

## 2023-05-21 RX ORDER — NEOMYCIN SULFATE, POLYMYXIN B SULFATE AND HYDROCORTISONE 10; 3.5; 1 MG/ML; MG/ML; [USP'U]/ML
3 SUSPENSION/ DROPS AURICULAR (OTIC) 4 TIMES DAILY
COMMUNITY
Start: 2022-01-13

## 2023-07-24 ENCOUNTER — OFFICE VISIT (OUTPATIENT)
Facility: CLINIC | Age: 88
End: 2023-07-24
Payer: COMMERCIAL

## 2023-07-24 VITALS
SYSTOLIC BLOOD PRESSURE: 115 MMHG | RESPIRATION RATE: 16 BRPM | HEIGHT: 64 IN | OXYGEN SATURATION: 97 % | WEIGHT: 139.2 LBS | TEMPERATURE: 97.9 F | DIASTOLIC BLOOD PRESSURE: 53 MMHG | BODY MASS INDEX: 23.76 KG/M2 | HEART RATE: 67 BPM

## 2023-07-24 DIAGNOSIS — R55 SYNCOPE, UNSPECIFIED SYNCOPE TYPE: Primary | ICD-10-CM

## 2023-07-24 LAB
ALBUMIN SERPL-MCNC: 3.7 G/DL (ref 3.5–5)
ALBUMIN/GLOB SERPL: 1.2 (ref 1.1–2.2)
ALP SERPL-CCNC: 63 U/L (ref 45–117)
ALT SERPL-CCNC: 8 U/L (ref 12–78)
ANION GAP SERPL CALC-SCNC: 7 MMOL/L (ref 5–15)
AST SERPL-CCNC: 10 U/L (ref 15–37)
BASOPHILS # BLD: 0 K/UL (ref 0–0.1)
BASOPHILS NFR BLD: 1 % (ref 0–1)
BILIRUB DIRECT SERPL-MCNC: 0.2 MG/DL (ref 0–0.2)
BILIRUB SERPL-MCNC: 0.6 MG/DL (ref 0.2–1)
BUN SERPL-MCNC: 14 MG/DL (ref 6–20)
BUN/CREAT SERPL: 14 (ref 12–20)
CALCIUM SERPL-MCNC: 9.1 MG/DL (ref 8.5–10.1)
CHLORIDE SERPL-SCNC: 106 MMOL/L (ref 97–108)
CO2 SERPL-SCNC: 30 MMOL/L (ref 21–32)
CREAT SERPL-MCNC: 1 MG/DL (ref 0.55–1.02)
DIFFERENTIAL METHOD BLD: ABNORMAL
EOSINOPHIL # BLD: 0.4 K/UL (ref 0–0.4)
EOSINOPHIL NFR BLD: 6 % (ref 0–7)
ERYTHROCYTE [DISTWIDTH] IN BLOOD BY AUTOMATED COUNT: 15 % (ref 11.5–14.5)
GLOBULIN SER CALC-MCNC: 3 G/DL (ref 2–4)
GLUCOSE SERPL-MCNC: 104 MG/DL (ref 65–100)
HCT VFR BLD AUTO: 45.7 % (ref 35–47)
HGB BLD-MCNC: 13.9 G/DL (ref 11.5–16)
IMM GRANULOCYTES # BLD AUTO: 0 K/UL (ref 0–0.04)
IMM GRANULOCYTES NFR BLD AUTO: 0 % (ref 0–0.5)
LYMPHOCYTES # BLD: 1.3 K/UL (ref 0.8–3.5)
LYMPHOCYTES NFR BLD: 21 % (ref 12–49)
MCH RBC QN AUTO: 29.2 PG (ref 26–34)
MCHC RBC AUTO-ENTMCNC: 30.4 G/DL (ref 30–36.5)
MCV RBC AUTO: 96 FL (ref 80–99)
MONOCYTES # BLD: 0.5 K/UL (ref 0–1)
MONOCYTES NFR BLD: 8 % (ref 5–13)
NEUTS SEG # BLD: 3.9 K/UL (ref 1.8–8)
NEUTS SEG NFR BLD: 64 % (ref 32–75)
NRBC # BLD: 0 K/UL (ref 0–0.01)
NRBC BLD-RTO: 0 PER 100 WBC
PLATELET # BLD AUTO: 188 K/UL (ref 150–400)
PMV BLD AUTO: 11.4 FL (ref 8.9–12.9)
POTASSIUM SERPL-SCNC: 4.7 MMOL/L (ref 3.5–5.1)
PROT SERPL-MCNC: 6.7 G/DL (ref 6.4–8.2)
RBC # BLD AUTO: 4.76 M/UL (ref 3.8–5.2)
SODIUM SERPL-SCNC: 143 MMOL/L (ref 136–145)
WBC # BLD AUTO: 6.1 K/UL (ref 3.6–11)

## 2023-07-24 PROCEDURE — 93000 ELECTROCARDIOGRAM COMPLETE: CPT | Performed by: FAMILY MEDICINE

## 2023-07-24 PROCEDURE — 1123F ACP DISCUSS/DSCN MKR DOCD: CPT | Performed by: FAMILY MEDICINE

## 2023-07-24 PROCEDURE — 99214 OFFICE O/P EST MOD 30 MIN: CPT | Performed by: FAMILY MEDICINE

## 2023-07-24 ASSESSMENT — ENCOUNTER SYMPTOMS
BLOOD IN STOOL: 0
PHOTOPHOBIA: 0
SHORTNESS OF BREATH: 0

## 2023-07-24 NOTE — PROGRESS NOTES
Mariah Plunkett  80 y.o. female  8/22/1931  MRK:[id number]  [facility] MEDICINE  Progress Note     Encounter Date: 7/24/2023    Assessment and Plan:       ICD-10-CM    1. Syncope, unspecified syncope type  R55 AMB POC EKG ROUTINE     [address], [place], Cardiology, [place]     CBC with Auto Differential     Basic Metabolic Panel     Hepatic Function Panel        1. Syncope, unspecified syncope type  No apparent abnormality on NEURO exam beyond Parkinsons  Possible arrhythmia  Today's ekg only showing PAC's  To cardiology to evaluate for cardiac causes of syncope  Check labs  -     AMB POC EKG ROUTINE  -     [address], [place], Cardiology, [place]  -     CBC with Auto Differential; Future  -     Basic Metabolic Panel; Future  -     Hepatic Function Panel; Future       I have discussed the diagnosis with the patient and the intended plan as seen in the above orders. she has expressed understanding. The patient has received an after-visit summary and questions were answered concerning future plans. I have discussed medication side effects and warnings with the patient as well. Electronically Signed: Alverda Carrel, MD    Current Medications after this visit     Current Outpatient Medications   Medication Sig    albuterol sulfate HFA (VENTOLIN HFA) 108 (90 Base) MCG/ACT inhaler INHALE 1-2 PUFFS BY MOUTH EVERY 4-6 HOURS AS NEEDED FOR SHORTNESS OF BREATH,COUGH,WHEEZING    Biotin 2.5 MG CAPS Take by mouth    carbidopa-levodopa (SINEMET)  MG per tablet 2 po tid at 8am, 12noon and 4pm    vitamin D 25 MCG (1000 UT) CAPS Take by mouth daily    esomeprazole (NEXIUM) 40 MG delayed release capsule Take 1 capsule by mouth daily    neomycin-polymyxin-hydrocortisone (CORTISPORIN) 3.5-72984-8 otic suspension Place 3 drops in ear(s) 4 times daily    simvastatin (ZOCOR) 40 MG tablet Take 1 tablet by mouth nightly     No current facility-administered medications for this visit.

## 2023-07-24 NOTE — PROGRESS NOTES
1. Have you been to the ER, urgent care clinic since your last visit? Hospitalized since your last visit? No    2. Have you seen or consulted any other health care providers outside of the [address] [place] [address] Avenue since your last visit? Include any pap smears or colon screening.  No        Chief Complaint   Patient presents with    Loss of Consciousness     Fainted Sunday at [employer]     Skin Problem     Bilateral legs and lower back  Itchy not painful

## 2023-08-15 ENCOUNTER — OFFICE VISIT (OUTPATIENT)
Age: 88
End: 2023-08-15
Payer: MEDICARE

## 2023-08-15 VITALS
DIASTOLIC BLOOD PRESSURE: 54 MMHG | WEIGHT: 137 LBS | HEIGHT: 64 IN | BODY MASS INDEX: 23.39 KG/M2 | HEART RATE: 59 BPM | SYSTOLIC BLOOD PRESSURE: 118 MMHG | OXYGEN SATURATION: 97 %

## 2023-08-15 DIAGNOSIS — R55 SYNCOPE, UNSPECIFIED SYNCOPE TYPE: ICD-10-CM

## 2023-08-15 DIAGNOSIS — E78.5 DYSLIPIDEMIA: Primary | ICD-10-CM

## 2023-08-15 PROCEDURE — 1123F ACP DISCUSS/DSCN MKR DOCD: CPT | Performed by: SPECIALIST

## 2023-08-15 PROCEDURE — 99204 OFFICE O/P NEW MOD 45 MIN: CPT | Performed by: SPECIALIST

## 2023-08-15 RX ORDER — ASPIRIN 81 MG/1
81 TABLET ORAL
COMMUNITY

## 2023-08-15 NOTE — PATIENT INSTRUCTIONS

## 2023-08-15 NOTE — PROGRESS NOTES
[  CARDIOLOGY OFFICE NOTE    Enrrique Turner MD, [facility] [address], [place]  Phone [phone]; Fax [phone]  Mobile [phone]   Voice Mail [phone]    Primary care: Gus Spears MD Will hold Eliquis with telemetry sorry for the long wait off all day however you need to Dr. Farhad Turner so nice to meet you since she       ATTENTION:   This medical record was transcribed using an electronic medical records/speech recognition system. Although proofread, it may and can contain electronic, spelling and other errors. Corrections may be executed at a later time. Please feel free to contact us for any clarifications as needed. Parvez Menon is a 80 y.o. female with  referred for syncope while in [facility]          Cardiac risk factors: advanced age (older than 54 for men, 72 for women) and dyslipidemia  I have personally obtained the history from the patient. HISTORY OF PRESENTING ILLNESS    Ms./Mr. Parvez Menon  80 y.o. is a very sweet lady who looks younger than stated age. She has Parkinson's disease as well as dyslipidemia. IN July at [place] she passed out. She was unaware of sx's prior to passing out. She did not eat that am. Did not eat or drink that am. No prior hx and no irregularity in heart rhythm. ACTIVE PROBLEM LIST     Patient Active Problem List    Diagnosis Date Noted    Bilateral carotid artery stenosis 09/21/2020    Parkinson's disease ([address]) 09/21/2020    Other emphysema ([address]) 10/01/2018    GERD (gastroesophageal reflux disease) 09/27/2018    Osteoarthritis of knee 09/27/2018    Paronychia of finger 09/27/2018    Dyspnea 09/27/2018    Hypercholesteremia 09/27/2018           PAST MEDICAL HISTORY     Past Medical History:   Diagnosis Date    Benign essential tremor     Carotid artery stenosis     Medical management, 10-49% blockage.     GERD (gastroesophageal reflux disease)     Hyperlipidemia     Osteoarthritis

## 2023-08-15 NOTE — PROGRESS NOTES
Per Dr. Tristian Bonilla- 30 day event monitor for syncope sent to monitor team.  Echo, lipid and follow up.

## 2023-08-15 NOTE — PROGRESS NOTES
NAME Jorge Luis Palomino    8/22/1931      MRN    [id number]      LAST OFFICE APPOINTMENT: Visit date not found     DIAGNOSIS    ICD-10-CM    1. Dyslipidemia  E78.5           HOME MEDICATION  Current Outpatient Medications   Medication Sig    aspirin 81 MG EC tablet Take 1 tablet by mouth EVERY OTHER DAY    Biotin 2.5 MG CAPS Take by mouth    carbidopa-levodopa (SINEMET)  MG per tablet 2 po tid at 8am, 12noon and 4pm    vitamin D 25 MCG (1000 UT) CAPS Take by mouth daily    esomeprazole (NEXIUM) 40 MG delayed release capsule Take 1 capsule by mouth daily    neomycin-polymyxin-hydrocortisone (CORTISPORIN) 3.5-29460-6 otic suspension Place 3 drops in ear(s) as needed    simvastatin (ZOCOR) 40 MG tablet Take 1 tablet by mouth nightly    albuterol sulfate HFA (PROVENTIL;VENTOLIN;PROAIR) 108 (90 Base) MCG/ACT inhaler INHALE 1-2 PUFFS BY MOUTH EVERY 4-6 HOURS AS NEEDED FOR SHORTNESS OF BREATH,COUGH,WHEEZING (Patient not taking: Reported on 8/15/2023)     No current facility-administered medications for this visit. VITAL SIGNS  Wt Readings from Last 3 Encounters:   08/15/23 137 lb (62.1 kg)   07/24/23 139 lb 3.2 oz (63.1 kg)   04/06/23 143 lb 6.4 oz (65 kg)     BP Readings from Last 3 Encounters:   08/15/23 (!) 118/54   07/24/23 (!) 115/53   04/06/23 (!) 146/63     Pulse Readings from Last 3 Encounters:   08/15/23 59   07/24/23 67   04/06/23 71         SPECIALTY COMMENTS  1. Echo  8/17/18-EF  65 %, AV  Leaflets exhibited moderate calcification and sclerosis    2.  Lipids  9/21/20- , HDL 79, LDL 35.2,

## 2023-08-16 ENCOUNTER — TELEPHONE (OUTPATIENT)
Age: 88
End: 2023-08-16

## 2023-08-16 NOTE — TELEPHONE ENCOUNTER
----- Message from Vania Costa LPN sent at 9/42/2612 12:24 PM EDT -----  Regarding: event  30 day event monitor for syncope per Dr. Rayray Hicks.

## 2023-08-18 ENCOUNTER — TELEPHONE (OUTPATIENT)
Facility: CLINIC | Age: 88
End: 2023-08-18

## 2023-08-18 NOTE — TELEPHONE ENCOUNTER
Pt is requesting to have some labs put in to check her Cholesterol. She states that another provider called in the labs and she wanted to get them done here but we don't do that. I did recommend her to go to lab ej to get them done but she still wanted me to ask.       Please advise

## 2023-08-24 ENCOUNTER — TELEPHONE (OUTPATIENT)
Facility: CLINIC | Age: 88
End: 2023-08-24

## 2023-09-01 DIAGNOSIS — E78.5 DYSLIPIDEMIA: Primary | ICD-10-CM

## 2023-09-03 ASSESSMENT — ENCOUNTER SYMPTOMS
CHEST TIGHTNESS: 0
COUGH: 0
BLOOD IN STOOL: 0
SINUS PAIN: 0
ABDOMINAL PAIN: 0
SHORTNESS OF BREATH: 0
CONSTIPATION: 0
NAUSEA: 0
DIARRHEA: 0

## 2023-09-05 ENCOUNTER — OFFICE VISIT (OUTPATIENT)
Facility: CLINIC | Age: 88
End: 2023-09-05
Payer: MEDICARE

## 2023-09-05 VITALS
HEART RATE: 68 BPM | RESPIRATION RATE: 17 BRPM | OXYGEN SATURATION: 95 % | WEIGHT: 135.6 LBS | DIASTOLIC BLOOD PRESSURE: 67 MMHG | HEIGHT: 64 IN | SYSTOLIC BLOOD PRESSURE: 123 MMHG | TEMPERATURE: 98.5 F | BODY MASS INDEX: 23.15 KG/M2

## 2023-09-05 DIAGNOSIS — E78.5 DYSLIPIDEMIA: ICD-10-CM

## 2023-09-05 DIAGNOSIS — R55 SYNCOPE, UNSPECIFIED SYNCOPE TYPE: Primary | ICD-10-CM

## 2023-09-05 PROCEDURE — 1123F ACP DISCUSS/DSCN MKR DOCD: CPT | Performed by: FAMILY MEDICINE

## 2023-09-05 PROCEDURE — 99214 OFFICE O/P EST MOD 30 MIN: CPT | Performed by: FAMILY MEDICINE

## 2023-09-05 RX ORDER — SIMVASTATIN 40 MG
40 TABLET ORAL NIGHTLY
Qty: 90 TABLET | Refills: 1 | Status: SHIPPED | OUTPATIENT
Start: 2023-09-05

## 2023-09-05 SDOH — ECONOMIC STABILITY: FOOD INSECURITY: WITHIN THE PAST 12 MONTHS, THE FOOD YOU BOUGHT JUST DIDN'T LAST AND YOU DIDN'T HAVE MONEY TO GET MORE.: NEVER TRUE

## 2023-09-05 SDOH — ECONOMIC STABILITY: HOUSING INSECURITY
IN THE LAST 12 MONTHS, WAS THERE A TIME WHEN YOU DID NOT HAVE A STEADY PLACE TO SLEEP OR SLEPT IN A SHELTER (INCLUDING NOW)?: NO

## 2023-09-05 SDOH — ECONOMIC STABILITY: INCOME INSECURITY: HOW HARD IS IT FOR YOU TO PAY FOR THE VERY BASICS LIKE FOOD, HOUSING, MEDICAL CARE, AND HEATING?: NOT HARD AT ALL

## 2023-09-05 SDOH — ECONOMIC STABILITY: FOOD INSECURITY: WITHIN THE PAST 12 MONTHS, YOU WORRIED THAT YOUR FOOD WOULD RUN OUT BEFORE YOU GOT MONEY TO BUY MORE.: NEVER TRUE

## 2023-09-05 NOTE — PATIENT INSTRUCTIONS
If you experience the following symptoms please call 9-1-1 immediately:  Heart attack symptoms  Tightness, pressure, squeezing, stabbing, or dull pain, most often in the center of the chest  Pain that spreads to the shoulders, neck, or arms  Irregular or rapid heartbeat  Cold sweat or clammy skin  Lightheadedness, weakness, or dizziness  Shortness of breath  Nausea, indigestion, and sometimes vomiting

## 2023-12-06 ENCOUNTER — OFFICE VISIT (OUTPATIENT)
Age: 88
End: 2023-12-06
Payer: MEDICARE

## 2023-12-06 ENCOUNTER — TELEPHONE (OUTPATIENT)
Age: 88
End: 2023-12-06

## 2023-12-06 VITALS
RESPIRATION RATE: 18 BRPM | WEIGHT: 132 LBS | HEART RATE: 75 BPM | SYSTOLIC BLOOD PRESSURE: 147 MMHG | OXYGEN SATURATION: 95 % | BODY MASS INDEX: 22.66 KG/M2 | DIASTOLIC BLOOD PRESSURE: 60 MMHG

## 2023-12-06 DIAGNOSIS — G20.B2 PARKINSON'S DISEASE WITH DYSKINESIA AND FLUCTUATING MANIFESTATIONS: Primary | ICD-10-CM

## 2023-12-06 PROCEDURE — 1123F ACP DISCUSS/DSCN MKR DOCD: CPT | Performed by: PSYCHIATRY & NEUROLOGY

## 2023-12-06 PROCEDURE — 99214 OFFICE O/P EST MOD 30 MIN: CPT | Performed by: PSYCHIATRY & NEUROLOGY

## 2023-12-06 ASSESSMENT — PATIENT HEALTH QUESTIONNAIRE - PHQ9
2. FEELING DOWN, DEPRESSED OR HOPELESS: 0
SUM OF ALL RESPONSES TO PHQ QUESTIONS 1-9: 0
SUM OF ALL RESPONSES TO PHQ QUESTIONS 1-9: 0
1. LITTLE INTEREST OR PLEASURE IN DOING THINGS: 0
SUM OF ALL RESPONSES TO PHQ9 QUESTIONS 1 & 2: 0
SUM OF ALL RESPONSES TO PHQ QUESTIONS 1-9: 0
SUM OF ALL RESPONSES TO PHQ QUESTIONS 1-9: 0

## 2023-12-06 NOTE — TELEPHONE ENCOUNTER
Is calling to advise that the referral that was received did not have a signature. Would like for referral to be re faxed with signature.     f- [phone]

## 2024-02-15 RX ORDER — ESOMEPRAZOLE MAGNESIUM 40 MG/1
40 CAPSULE, DELAYED RELEASE ORAL DAILY
Qty: 90 CAPSULE | Refills: 3 | Status: SHIPPED | OUTPATIENT
Start: 2024-02-15

## 2024-05-01 ENCOUNTER — TELEPHONE (OUTPATIENT)
Facility: CLINIC | Age: 89
End: 2024-05-01

## 2024-05-21 DIAGNOSIS — G20.B2 PARKINSON'S DISEASE WITH DYSKINESIA AND FLUCTUATING MANIFESTATIONS (HCC): Primary | ICD-10-CM

## 2024-06-17 DIAGNOSIS — E78.5 DYSLIPIDEMIA: ICD-10-CM

## 2024-06-17 RX ORDER — SIMVASTATIN 40 MG
40 TABLET ORAL NIGHTLY
Qty: 90 TABLET | Refills: 1 | Status: SHIPPED | OUTPATIENT
Start: 2024-06-17

## 2024-06-27 ENCOUNTER — OFFICE VISIT (OUTPATIENT)
Age: 89
End: 2024-06-27
Payer: MEDICARE

## 2024-06-27 VITALS
HEIGHT: 64 IN | WEIGHT: 131 LBS | HEART RATE: 62 BPM | DIASTOLIC BLOOD PRESSURE: 66 MMHG | BODY MASS INDEX: 22.36 KG/M2 | OXYGEN SATURATION: 95 % | SYSTOLIC BLOOD PRESSURE: 140 MMHG | RESPIRATION RATE: 16 BRPM

## 2024-06-27 DIAGNOSIS — G20.A2 PARKINSON'S DISEASE WITHOUT DYSKINESIA, WITH FLUCTUATING MANIFESTATIONS (HCC): ICD-10-CM

## 2024-06-27 PROCEDURE — 99214 OFFICE O/P EST MOD 30 MIN: CPT | Performed by: PSYCHIATRY & NEUROLOGY

## 2024-06-27 PROCEDURE — 1123F ACP DISCUSS/DSCN MKR DOCD: CPT | Performed by: PSYCHIATRY & NEUROLOGY

## 2024-06-27 NOTE — PROGRESS NOTES
[facility] and [facility] at [facility]ics at [address] [place] [address] [address] [facility] [address] [place] [address] [phone]              Chief Complaint   Patient presents with    PD     Sinemet  2 tablets TID // states tremors have been a little worse over the last 6 mo's - L leg \"quivering\" is new     Current Outpatient Medications   Medication Sig Dispense Refill    simvastatin (ZOCOR) 40 MG tablet Take 1 tablet by mouth nightly 90 tablet 1    carbidopa-levodopa (SINEMET)  MG per tablet TAKE 2 TABLETS THREE TIMES A DAY AT 8 A.M., 12 NOON AND 4 P.M. 540 tablet 0    esomeprazole (NEXIUM) 40 MG delayed release capsule TAKE 1 CAPSULE DAILY 90 capsule 3    aspirin 81 MG EC tablet Take 1 tablet by mouth EVERY OTHER DAY      Biotin 2.5 MG CAPS Take by mouth      vitamin D 25 MCG (1000 UT) CAPS Take by mouth daily      neomycin-polymyxin-hydrocortisone (CORTISPORIN) 3.5-63750-2 otic suspension Place 3 drops in ear(s) as needed      albuterol sulfate HFA (PROVENTIL;VENTOLIN;PROAIR) 108 (90 Base) MCG/ACT inhaler INHALE 1-2 PUFFS BY MOUTH EVERY 4-6 HOURS AS NEEDED FOR SHORTNESS OF BREATH,COUGH,WHEEZING (Patient not taking: Reported on 8/15/2023)       No current facility-administered medications for this visit.      Allergies   Allergen Reactions    Sulfa Antibiotics Itching     Social History     Tobacco Use    Smoking status: Former     Current packs/day: 0.00     Types: Cigarettes     Quit date: 1983     Years since quittin.2    Smokeless tobacco: Never   Substance Use Topics    Alcohol use: Yes    Drug use: Never     89-year-old lady following up today for Parkinson's.  She is on Sinemet 2 tablets 3 times daily.  Today she notes there are days where she has increased tremor.  Other days not.  She did complete the physical therapy.  She did not see much benefit from it.  She is

## 2024-07-01 ENCOUNTER — OFFICE VISIT (OUTPATIENT)
Facility: CLINIC | Age: 89
End: 2024-07-01
Payer: MEDICARE

## 2024-07-01 VITALS — WEIGHT: 131.8 LBS | HEIGHT: 64 IN | RESPIRATION RATE: 18 BRPM | BODY MASS INDEX: 22.5 KG/M2

## 2024-07-01 DIAGNOSIS — I73.9 PERIPHERAL VASCULAR DISEASE, UNSPECIFIED (HCC): ICD-10-CM

## 2024-07-01 DIAGNOSIS — R55 SYNCOPE, UNSPECIFIED SYNCOPE TYPE: ICD-10-CM

## 2024-07-01 DIAGNOSIS — K21.9 GASTROESOPHAGEAL REFLUX DISEASE WITHOUT ESOPHAGITIS: ICD-10-CM

## 2024-07-01 DIAGNOSIS — Z00.00 MEDICARE ANNUAL WELLNESS VISIT, SUBSEQUENT: Primary | ICD-10-CM

## 2024-07-01 DIAGNOSIS — J43.8 OTHER EMPHYSEMA (HCC): ICD-10-CM

## 2024-07-01 DIAGNOSIS — E78.5 DYSLIPIDEMIA: ICD-10-CM

## 2024-07-01 PROCEDURE — 99213 OFFICE O/P EST LOW 20 MIN: CPT | Performed by: FAMILY MEDICINE

## 2024-07-01 PROCEDURE — 1123F ACP DISCUSS/DSCN MKR DOCD: CPT | Performed by: FAMILY MEDICINE

## 2024-07-01 PROCEDURE — G0439 PPPS, SUBSEQ VISIT: HCPCS | Performed by: FAMILY MEDICINE

## 2024-07-01 RX ORDER — SIMVASTATIN 40 MG
40 TABLET ORAL NIGHTLY
Qty: 90 TABLET | Refills: 3 | Status: SHIPPED | OUTPATIENT
Start: 2024-07-01 | End: 2024-07-02

## 2024-07-01 RX ORDER — ESOMEPRAZOLE MAGNESIUM 40 MG/1
40 CAPSULE, DELAYED RELEASE ORAL DAILY
Qty: 90 CAPSULE | Refills: 3 | Status: SHIPPED | OUTPATIENT
Start: 2024-07-01

## 2024-07-01 ASSESSMENT — ENCOUNTER SYMPTOMS
SHORTNESS OF BREATH: 0
BLOOD IN STOOL: 0

## 2024-07-01 ASSESSMENT — PATIENT HEALTH QUESTIONNAIRE - PHQ9
SUM OF ALL RESPONSES TO PHQ QUESTIONS 1-9: 0
SUM OF ALL RESPONSES TO PHQ QUESTIONS 1-9: 0
2. FEELING DOWN, DEPRESSED OR HOPELESS: NOT AT ALL
1. LITTLE INTEREST OR PLEASURE IN DOING THINGS: NOT AT ALL
SUM OF ALL RESPONSES TO PHQ QUESTIONS 1-9: 0
SUM OF ALL RESPONSES TO PHQ9 QUESTIONS 1 & 2: 0
SUM OF ALL RESPONSES TO PHQ QUESTIONS 1-9: 0

## 2024-07-01 ASSESSMENT — LIFESTYLE VARIABLES
HOW MANY STANDARD DRINKS CONTAINING ALCOHOL DO YOU HAVE ON A TYPICAL DAY: 1 OR 2
HOW OFTEN DO YOU HAVE A DRINK CONTAINING ALCOHOL: 2-3 TIMES A WEEK

## 2024-07-01 NOTE — PATIENT INSTRUCTIONS
and medicines. These can increase your chances of quitting for good. Quitting is one of the most important things you can do to protect your heart. It is never too late to quit. Try to avoid secondhand smoke too.     Stay at a weight that's healthy for you. Talk to your doctor if you need help losing weight.     Try to get 7 to 9 hours of sleep each night.     Limit alcohol to 2 drinks a day for men and 1 drink a day for women. Too much alcohol can cause health problems.     Manage other health problems such as diabetes, high blood pressure, and high cholesterol. If you think you may have a problem with alcohol or drug use, talk to your doctor.   Medicines    Take your medicines exactly as prescribed. Call your doctor if you think you are having a problem with your medicine.     If your doctor recommends aspirin, take the amount directed each day. Make sure you take aspirin and not another kind of pain reliever, such as acetaminophen (Tylenol).   When should you call for help?   Call 911 if you have symptoms of a heart attack. These may include:    Chest pain or pressure, or a strange feeling in the chest.     Sweating.     Shortness of breath.     Pain, pressure, or a strange feeling in the back, neck, jaw, or upper belly or in one or both shoulders or arms.     Lightheadedness or sudden weakness.     A fast or irregular heartbeat.   After you call 911, the  may tell you to chew 1 adult-strength or 2 to 4 low-dose aspirin. Wait for an ambulance. Do not try to drive yourself.  Watch closely for changes in your health, and be sure to contact your doctor if you have any problems.  Where can you learn more?  Go to https://www.e-SENS.net/patientEd and enter F075 to learn more about \"A Healthy Heart: Care Instructions.\"  Current as of: June 24, 2023  Content Version: 14.1  © 6084-7240 [employer].   Care instructions adapted under license by [employer]. If you have questions about a medical

## 2024-07-01 NOTE — PROGRESS NOTES
Identified patient with two patient identifiers (name and ). Reviewed chart in preparation for visit and have obtained necessary documentation.    Neena French is a 89 y.o. female  Chief Complaint   Patient presents with    Medicare AWV     Resp 18   Ht 1.626 m (5' 4\")   Wt 59.8 kg (131 lb 12.8 oz)   BMI 22.62 kg/m²     1. Have you been to the ER, urgent care clinic since your last visit?  Hospitalized since your last visit?no    2. Have you seen or consulted any other health care providers outside of the [facility] since your last visit?  Include any pap smears or colon screening. no

## 2024-07-02 DIAGNOSIS — E78.5 DYSLIPIDEMIA: ICD-10-CM

## 2024-07-02 RX ORDER — SIMVASTATIN 40 MG
40 TABLET ORAL NIGHTLY
Qty: 90 TABLET | Refills: 3 | Status: SHIPPED | OUTPATIENT
Start: 2024-07-02

## 2024-07-02 NOTE — TELEPHONE ENCOUNTER
Pt called in wanting her prescription for   simvastatin (ZOCOR) 40 MG tablet and esomeprazole (NEXIUM) 40 MG delayed release capsule to be sent to [facility] - [facility], [address] - P [phone] - F [phone] [[id number]] instead of [facility].        Please advise

## 2024-08-16 DIAGNOSIS — G20.A2 PARKINSON'S DISEASE WITHOUT DYSKINESIA, WITH FLUCTUATING MANIFESTATIONS (HCC): Primary | ICD-10-CM

## 2024-08-16 RX ORDER — CARBIDOPA AND LEVODOPA 25; 100 MG/1; MG/1
TABLET, EXTENDED RELEASE ORAL
Qty: 540 TABLET | Refills: 3 | Status: SHIPPED | OUTPATIENT
Start: 2024-08-16

## 2024-08-28 ENCOUNTER — TELEPHONE (OUTPATIENT)
Age: 89
End: 2024-08-28

## 2024-09-13 ENCOUNTER — TELEPHONE (OUTPATIENT)
Age: 89
End: 2024-09-13

## 2025-03-12 DIAGNOSIS — K21.9 GASTROESOPHAGEAL REFLUX DISEASE WITHOUT ESOPHAGITIS: ICD-10-CM

## 2025-03-12 RX ORDER — ESOMEPRAZOLE MAGNESIUM 40 MG/1
40 CAPSULE, DELAYED RELEASE ORAL DAILY
Qty: 90 CAPSULE | Refills: 3 | Status: SHIPPED | OUTPATIENT
Start: 2025-03-12